# Patient Record
Sex: MALE | Race: WHITE | NOT HISPANIC OR LATINO | Employment: FULL TIME | URBAN - METROPOLITAN AREA
[De-identification: names, ages, dates, MRNs, and addresses within clinical notes are randomized per-mention and may not be internally consistent; named-entity substitution may affect disease eponyms.]

---

## 2017-01-30 ENCOUNTER — PATIENT MESSAGE (OUTPATIENT)
Dept: GASTROENTEROLOGY | Facility: CLINIC | Age: 57
End: 2017-01-30

## 2017-01-30 ENCOUNTER — OFFICE VISIT (OUTPATIENT)
Dept: CARDIOLOGY | Facility: CLINIC | Age: 57
DRG: 305 | End: 2017-01-30
Payer: COMMERCIAL

## 2017-01-30 VITALS
WEIGHT: 209 LBS | DIASTOLIC BLOOD PRESSURE: 98 MMHG | SYSTOLIC BLOOD PRESSURE: 146 MMHG | HEIGHT: 70 IN | HEART RATE: 73 BPM | BODY MASS INDEX: 29.92 KG/M2

## 2017-01-30 DIAGNOSIS — I15.9 SECONDARY HYPERTENSION: Primary | ICD-10-CM

## 2017-01-30 DIAGNOSIS — I10 ESSENTIAL HYPERTENSION: ICD-10-CM

## 2017-01-30 DIAGNOSIS — K21.9 GASTROESOPHAGEAL REFLUX DISEASE, ESOPHAGITIS PRESENCE NOT SPECIFIED: ICD-10-CM

## 2017-01-30 DIAGNOSIS — R30.0 DYSURIA: ICD-10-CM

## 2017-01-30 PROCEDURE — 3078F DIAST BP <80 MM HG: CPT | Mod: S$GLB,,, | Performed by: INTERNAL MEDICINE

## 2017-01-30 PROCEDURE — 3077F SYST BP >= 140 MM HG: CPT | Mod: S$GLB,,, | Performed by: INTERNAL MEDICINE

## 2017-01-30 PROCEDURE — 1159F MED LIST DOCD IN RCRD: CPT | Mod: S$GLB,,, | Performed by: INTERNAL MEDICINE

## 2017-01-30 PROCEDURE — 99999 PR PBB SHADOW E&M-EST. PATIENT-LVL III: CPT | Mod: PBBFAC,,, | Performed by: INTERNAL MEDICINE

## 2017-01-30 PROCEDURE — 99202 OFFICE O/P NEW SF 15 MIN: CPT | Mod: S$GLB,,, | Performed by: INTERNAL MEDICINE

## 2017-01-30 RX ORDER — OMEPRAZOLE 20 MG/1
20 CAPSULE, DELAYED RELEASE ORAL DAILY
Qty: 60 CAPSULE | Refills: 0 | Status: SHIPPED | OUTPATIENT
Start: 2017-01-30 | End: 2022-01-19

## 2017-01-30 RX ORDER — METOPROLOL SUCCINATE 100 MG/1
100 TABLET, EXTENDED RELEASE ORAL NIGHTLY
Qty: 90 TABLET | Refills: 4 | Status: ON HOLD | OUTPATIENT
Start: 2017-01-30 | End: 2017-02-01 | Stop reason: HOSPADM

## 2017-01-30 NOTE — PROGRESS NOTES
"Subjective:   Patient ID:  Steve Carranza is a 56 y.o. male who presents for evaluation and treatment of malaise    HPI   Steve was feeling well until one week ago when he developed the onset of malaise which has persisted.  He also complains of generalized "achy head" which he distinguishes from a headache, full head, and lightheadedness when urinating standing.  He has taken zyrtec with maybe some relief but advil has done nothing.  He was on two meds for HTN (Toprol and Norvasc) until a year ago when Regino Cochran discontinued the beta blocker because he was exercizing like a maniac and lost 25 lbs and his BP was controlled.  He now only exercises twice a week (tennis or bicycle) and has gained weight back.    PMH:    HTN  GERD    Review of Systems   Constitution: Positive for weakness, malaise/fatigue and weight gain. Negative for weight loss.   HENT: Positive for headaches. Negative for congestion, nosebleeds and sore throat.    Eyes: Negative for blurred vision, double vision, pain and visual disturbance.   Cardiovascular: Negative for chest pain, claudication, cyanosis, dyspnea on exertion, irregular heartbeat, leg swelling, near-syncope, orthopnea, palpitations, paroxysmal nocturnal dyspnea and syncope.   Respiratory: Negative for cough, hemoptysis, shortness of breath, snoring, sputum production and wheezing.    Endocrine: Negative for polydipsia and polyuria.   Hematologic/Lymphatic: Negative for bleeding problem. Does not bruise/bleed easily.   Skin: Negative for color change, poor wound healing and rash.   Musculoskeletal: Negative for arthritis, back pain, joint pain, muscle weakness, myalgias and neck pain.   Gastrointestinal: Negative for abdominal pain, anorexia, constipation, diarrhea, heartburn, hematemesis, hematochezia, hemorrhoids, jaundice, melena, nausea and vomiting.   Genitourinary: Negative for flank pain, hematuria, hesitancy, incomplete emptying and nocturia.   Neurological: Positive for " light-headedness. Negative for excessive daytime sleepiness, dizziness, focal weakness, loss of balance, numbness, paresthesias, seizures, sensory change, tremors and vertigo.   Psychiatric/Behavioral: Negative for altered mental status, depression, hallucinations and memory loss. The patient does not have insomnia and is not nervous/anxious.        Objective:   Physical Exam   Constitutional: He is oriented to person, place, and time. He appears well-developed and well-nourished. No distress.   HENT:   Head: Normocephalic and atraumatic.   Eyes: Conjunctivae and EOM are normal. Pupils are equal, round, and reactive to light. No scleral icterus.   Neck: Normal range of motion. Neck supple. No JVD present. No tracheal deviation present. No thyromegaly present.   Cardiovascular: Normal rate, regular rhythm and normal pulses.  PMI is not displaced.  Exam reveals no gallop, no S3, no S4 and no friction rub.    No murmur heard.  Pulmonary/Chest: Effort normal and breath sounds normal. He has no wheezes. He has no rales.   Abdominal: Soft. Bowel sounds are normal. He exhibits no distension and no mass. There is no tenderness.   Musculoskeletal: Normal range of motion. He exhibits no edema or tenderness.   Neurological: He is alert and oriented to person, place, and time.   Skin: Skin is warm and dry. No rash noted. No erythema.   Psychiatric: He has a normal mood and affect. His behavior is normal.     CBC, CHEM 20, and UA all normal.    Assessment:     1. Essential hypertension: Poorly controlled.  Likely the cause of all of his symptoms of malaise, headache, lightheadedness, etc.   2. Gastroesophageal reflux disease, esophagitis presence not specified        Plan:     Restart Toprol 100mg Daily with BID dosing today.  Call me in two days with f/u.  CBC, CHem 20, UA ordered and NORMAL  F/U in one week with BP check.

## 2017-01-30 NOTE — MR AVS SNAPSHOT
[facility] Card  [address]  [facility] [address]  Phone: [phone]                  Steve Carranza   2017 9:00 AM   Office Visit    Description:  Male : 1960   Provider:  Daniel Lozano MD   Department:  [facility]           Diagnoses this Visit        Comments    Essential hypertension         Gastroesophageal reflux disease, esophagitis presence not specified                To Do List           Future Appointments        Provider Department Dept Phone    2017 9:20 AM Daniel Lozano MD [facility] [phone]      Goals (5 Years of Data)     None       These Medications        Disp Refills Start End    omeprazole (PRILOSEC) 20 MG capsule 60 capsule 0 2017     Take 1 capsule (20 mg total) by mouth once daily. As needed - Oral    Pharmacy: [facility] [address] AT [facility] Ph #: [phone]       metoprolol succinate (TOPROL-XL) 100 MG 24 hr tablet 90 tablet 4 2017     Take 1 tablet (100 mg total) by mouth every evening. - Oral    Pharmacy: [facility] [id number] AT [facility] Ph #: [phone]         [facility]     [facility] On Call Nurse Care Line -  Assistance  Registered nurses in the [facility] provide clinical advisement, health education, appointment booking, and other advisory services.  Call for this free service at [phone].             Medications           Message regarding Medications     Verify the changes and/or additions to your medication regime listed below are the same as discussed with your clinician today.  If any of these changes or additions are incorrect, please notify your healthcare provider.        CHANGE how you are taking these medications     Start Taking Instead of    omeprazole (PRILOSEC) 20 MG capsule omeprazole (PRILOSEC) 20 MG  "capsule    Dosage:  Take 1 capsule (20 mg total) by mouth once daily. As needed Dosage:  TAKE ONE CAPSULE BY MOUTH EVERY DAY    Reason for Change:  Reorder     metoprolol succinate (TOPROL-XL) 100 MG 24 hr tablet metoprolol succinate (TOPROL-XL) 100 MG 24 hr tablet    Dosage:  Take 1 tablet (100 mg total) by mouth every evening. Dosage:  Take 100 mg by mouth every evening.    Reason for Change:  Reorder       STOP taking these medications     oxycodone-acetaminophen  mg (PERCOCET)  mg per tablet Take 1 tablet by mouth every 4 (four) hours as needed for Pain. No drinking driving or operating machinery    pantoprazole (PROTONIX) 20 MG tablet Take 20 mg by mouth every evening.    meloxicam (MOBIC) 15 MG tablet TAKE 1 TABLET BY MOUTH EVERY DAY           Verify that the below list of medications is an accurate representation of the medications you are currently taking.  If none reported, the list may be blank. If incorrect, please contact your healthcare provider. Carry this list with you in case of emergency.           Current Medications     amlodipine (NORVASC) 10 MG tablet Take 10 mg by mouth once daily.    aspirin (ECOTRIN) 81 MG EC tablet Take 81 mg by mouth once daily.    omeprazole (PRILOSEC) 20 MG capsule Take 1 capsule (20 mg total) by mouth once daily. As needed    metoprolol succinate (TOPROL-XL) 100 MG 24 hr tablet Take 1 tablet (100 mg total) by mouth every evening.    multivitamin capsule Take 1 capsule by mouth once daily.           Clinical Reference Information           Vital Signs - Last Recorded  Most recent update: 1/30/2017  9:18 AM by Martha Orozco MA    BP Pulse Ht Wt BMI    (!) 146/98 (BP Location: Right arm, Patient Position: Sitting, BP Method: Automatic) 73 5' 9.5" (1.765 m) 94.8 kg (208 lb 15.9 oz) 30.42 kg/m2      Blood Pressure          Most Recent Value    BP  (!)  146/98      Allergies as of 1/30/2017     Penicillins      Immunizations Administered on Date of Encounter - " 1/30/2017     None

## 2017-02-01 ENCOUNTER — HOSPITAL ENCOUNTER (INPATIENT)
Facility: HOSPITAL | Age: 57
LOS: 1 days | Discharge: HOME | DRG: 305 | End: 2017-02-01
Attending: INTERNAL MEDICINE | Admitting: INTERNAL MEDICINE
Payer: COMMERCIAL

## 2017-02-01 ENCOUNTER — TELEPHONE (OUTPATIENT)
Dept: ENDOCRINOLOGY | Facility: CLINIC | Age: 57
End: 2017-02-01

## 2017-02-01 ENCOUNTER — DOCUMENTATION ONLY (OUTPATIENT)
Dept: CARDIOLOGY | Facility: CLINIC | Age: 57
End: 2017-02-01

## 2017-02-01 VITALS
DIASTOLIC BLOOD PRESSURE: 83 MMHG | HEART RATE: 65 BPM | BODY MASS INDEX: 29.35 KG/M2 | TEMPERATURE: 98 F | WEIGHT: 205 LBS | OXYGEN SATURATION: 99 % | HEIGHT: 70 IN | SYSTOLIC BLOOD PRESSURE: 128 MMHG | RESPIRATION RATE: 18 BRPM

## 2017-02-01 DIAGNOSIS — I10 HTN (HYPERTENSION), MALIGNANT: Primary | ICD-10-CM

## 2017-02-01 DIAGNOSIS — I16.0 HYPERTENSIVE URGENCY: Primary | ICD-10-CM

## 2017-02-01 LAB
ALBUMIN SERPL BCP-MCNC: 4.4 G/DL
ALP SERPL-CCNC: 62 U/L
ALT SERPL W/O P-5'-P-CCNC: 30 U/L
ANION GAP SERPL CALC-SCNC: 13 MMOL/L
AST SERPL-CCNC: 46 U/L
BASOPHILS # BLD AUTO: 0.01 K/UL
BASOPHILS NFR BLD: 0.2 %
BILIRUB SERPL-MCNC: 1.1 MG/DL
BUN SERPL-MCNC: 20 MG/DL
CALCIUM SERPL-MCNC: 9.5 MG/DL
CHLORIDE SERPL-SCNC: 102 MMOL/L
CO2 SERPL-SCNC: 21 MMOL/L
CREAT SERPL-MCNC: 1 MG/DL
DIFFERENTIAL METHOD: ABNORMAL
EOSINOPHIL # BLD AUTO: 0.2 K/UL
EOSINOPHIL NFR BLD: 2.3 %
ERYTHROCYTE [DISTWIDTH] IN BLOOD BY AUTOMATED COUNT: 12.6 %
EST. GFR  (AFRICAN AMERICAN): >60 ML/MIN/1.73 M^2
EST. GFR  (NON AFRICAN AMERICAN): >60 ML/MIN/1.73 M^2
GLUCOSE SERPL-MCNC: 131 MG/DL
HCT VFR BLD AUTO: 39.3 %
HGB BLD-MCNC: 13.7 G/DL
LYMPHOCYTES # BLD AUTO: 1.3 K/UL
LYMPHOCYTES NFR BLD: 20.2 %
MCH RBC QN AUTO: 31.6 PG
MCHC RBC AUTO-ENTMCNC: 34.9 %
MCV RBC AUTO: 91 FL
MONOCYTES # BLD AUTO: 0.6 K/UL
MONOCYTES NFR BLD: 9 %
NEUTROPHILS # BLD AUTO: 4.4 K/UL
NEUTROPHILS NFR BLD: 68 %
PLATELET # BLD AUTO: 249 K/UL
PMV BLD AUTO: 10.3 FL
POCT GLUCOSE: 96 MG/DL (ref 70–110)
POTASSIUM SERPL-SCNC: 5 MMOL/L
PROT SERPL-MCNC: 8.1 G/DL
RBC # BLD AUTO: 4.33 M/UL
SODIUM SERPL-SCNC: 136 MMOL/L
T4 FREE SERPL-MCNC: 0.98 NG/DL
TSH SERPL DL<=0.005 MIU/L-ACNC: 3.58 UIU/ML
WBC # BLD AUTO: 6.47 K/UL

## 2017-02-01 PROCEDURE — 84439 ASSAY OF FREE THYROXINE: CPT

## 2017-02-01 PROCEDURE — 36415 COLL VENOUS BLD VENIPUNCTURE: CPT

## 2017-02-01 PROCEDURE — 82962 GLUCOSE BLOOD TEST: CPT

## 2017-02-01 PROCEDURE — 85025 COMPLETE CBC W/AUTO DIFF WBC: CPT

## 2017-02-01 PROCEDURE — 84244 ASSAY OF RENIN: CPT | Mod: 91

## 2017-02-01 PROCEDURE — 80053 COMPREHEN METABOLIC PANEL: CPT

## 2017-02-01 PROCEDURE — 11000001 HC ACUTE MED/SURG PRIVATE ROOM

## 2017-02-01 PROCEDURE — 82088 ASSAY OF ALDOSTERONE: CPT

## 2017-02-01 PROCEDURE — 99222 1ST HOSP IP/OBS MODERATE 55: CPT | Mod: ,,, | Performed by: INTERNAL MEDICINE

## 2017-02-01 PROCEDURE — 84244 ASSAY OF RENIN: CPT

## 2017-02-01 PROCEDURE — 82088 ASSAY OF ALDOSTERONE: CPT | Mod: 91

## 2017-02-01 PROCEDURE — 25000003 PHARM REV CODE 250: Performed by: INTERNAL MEDICINE

## 2017-02-01 PROCEDURE — 84443 ASSAY THYROID STIM HORMONE: CPT

## 2017-02-01 PROCEDURE — 83835 ASSAY OF METANEPHRINES: CPT

## 2017-02-01 RX ORDER — ALPRAZOLAM 0.25 MG/1
0.25 TABLET ORAL 3 TIMES DAILY PRN
Status: DISCONTINUED | OUTPATIENT
Start: 2017-02-01 | End: 2017-02-01 | Stop reason: HOSPADM

## 2017-02-01 RX ORDER — IBUPROFEN 200 MG
16 TABLET ORAL
Status: DISCONTINUED | OUTPATIENT
Start: 2017-02-01 | End: 2017-02-01 | Stop reason: HOSPADM

## 2017-02-01 RX ORDER — IBUPROFEN 200 MG
24 TABLET ORAL
Status: DISCONTINUED | OUTPATIENT
Start: 2017-02-01 | End: 2017-02-01 | Stop reason: HOSPADM

## 2017-02-01 RX ORDER — GLUCAGON 1 MG
1 KIT INJECTION
Status: CANCELLED | OUTPATIENT
Start: 2017-02-01

## 2017-02-01 RX ORDER — METOPROLOL SUCCINATE 100 MG/1
100 TABLET, EXTENDED RELEASE ORAL 2 TIMES DAILY
Status: DISCONTINUED | OUTPATIENT
Start: 2017-02-01 | End: 2017-02-01 | Stop reason: HOSPADM

## 2017-02-01 RX ORDER — IBUPROFEN 200 MG
16 TABLET ORAL
Status: CANCELLED | OUTPATIENT
Start: 2017-02-01

## 2017-02-01 RX ORDER — ALPRAZOLAM 0.25 MG/1
0.25 TABLET ORAL 3 TIMES DAILY
Qty: 30 TABLET | Refills: 0 | Status: SHIPPED | OUTPATIENT
Start: 2017-02-01 | End: 2017-12-05

## 2017-02-01 RX ORDER — METOPROLOL SUCCINATE 100 MG/1
100 TABLET, EXTENDED RELEASE ORAL 2 TIMES DAILY
Qty: 180 TABLET | Refills: 4 | Status: SHIPPED | OUTPATIENT
Start: 2017-02-01 | End: 2018-02-01

## 2017-02-01 RX ORDER — IBUPROFEN 200 MG
24 TABLET ORAL
Status: CANCELLED | OUTPATIENT
Start: 2017-02-01

## 2017-02-01 RX ORDER — GLUCAGON 1 MG
1 KIT INJECTION
Status: DISCONTINUED | OUTPATIENT
Start: 2017-02-01 | End: 2017-02-01 | Stop reason: HOSPADM

## 2017-02-01 RX ADMIN — ALPRAZOLAM 0.25 MG: 0.25 TABLET ORAL at 04:02

## 2017-02-01 RX ADMIN — METOPROLOL SUCCINATE 100 MG: 100 TABLET, FILM COATED, EXTENDED RELEASE ORAL at 01:02

## 2017-02-01 NOTE — PLAN OF CARE
Problem: Patient Care Overview  Goal: Plan of Care Review  Outcome: Ongoing (interventions implemented as appropriate)  Admit assessment completed. IV placed x 1.  Labs sent.  Plan of care initiated. Call light within reach. Bed low and locked.  Pt oriented to room.   Will continue to monitor.

## 2017-02-01 NOTE — IP AVS SNAPSHOT
[facility]  [address]  [facility] [address]  Phone: [phone]           Patient Discharge Instructions     Our goal is to set you up for success. This packet includes information on your condition, medications, and your home care. It will help you to care for yourself so you don't get sicker and need to go back to the hospital.     Please ask your nurse if you have any questions.        There are many details to remember when preparing to leave the hospital. Here is what you will need to do:    1. Take your medicine. If you are prescribed medications, review your Medication List in the following pages. You may have new medications to  at the pharmacy and others that you'll need to stop taking. Review the instructions for how and when to take your medications. Talk with your doctor or nurses if you are unsure of what to do.     2. Go to your follow-up appointments. Specific follow-up information is listed in the following pages. Your may be contacted by a transition nurse or clinical provider about future appointments. Be sure we have all of the phone numbers to reach you, if needed. Please contact your provider's office if you are unable to make an appointment.     3. Watch for warning signs. Your doctor or nurse will give you detailed warning signs to watch for and when to call for assistance. These instructions may also include educational information about your condition. If you experience any of warning signs to your health, call your doctor.               [facility]  Unless otherwise directed by your provider, please contact [facility], our nurse care line that is available for 24/7 assistance.     [phone] (toll-free)    Registered nurses in the [facility] provide clinical advisement, health education, appointment booking, and other advisory services.                    ** Verify the list of medication(s) below is accurate and up  to date. Carry this with you in case of emergency. If your medications have changed, please notify your healthcare provider.             Medication List      START taking these medications        Additional Info                      alprazolam 0.25 MG tablet   Commonly known as:  XANAX   Quantity:  30 tablet   Refills:  0   Dose:  0.25 mg    Last time this was given:  0.25 mg on 2/1/2017  4:08 PM   Instructions:  Take 1 tablet (0.25 mg total) by mouth 3 (three) times daily.     Begin Date    AM    Noon    PM    Bedtime         CHANGE how you take these medications        Additional Info                      metoprolol succinate 100 MG 24 hr tablet   Commonly known as:  TOPROL-XL   Quantity:  180 tablet   Refills:  4   Dose:  100 mg   What changed:  when to take this    Last time this was given:  100 mg on 2/1/2017  1:48 PM   Instructions:  Take 1 tablet (100 mg total) by mouth 2 (two) times daily.     Begin Date    AM    Noon    PM    Bedtime         CONTINUE taking these medications        Additional Info                      amlodipine 10 MG tablet   Commonly known as:  NORVASC   Refills:  0   Dose:  10 mg    Instructions:  Take 10 mg by mouth once daily.     Begin Date    AM    Noon    PM    Bedtime       aspirin 81 MG EC tablet   Commonly known as:  ECOTRIN   Refills:  0   Dose:  81 mg    Instructions:  Take 81 mg by mouth once daily.     Begin Date    AM    Noon    PM    Bedtime       multivitamin capsule   Refills:  0   Dose:  1 capsule    Instructions:  Take 1 capsule by mouth once daily.     Begin Date    AM    Noon    PM    Bedtime       omeprazole 20 MG capsule   Commonly known as:  PRILOSEC   Quantity:  60 capsule   Refills:  0   Dose:  20 mg    Instructions:  Take 1 capsule (20 mg total) by mouth once daily. As needed     Begin Date    AM    Noon    PM    Bedtime            Where to Get Your Medications      These medications were sent to [facility] [id number] [facility] [address] [place] [facility]  [address] [facility] [address]     Phone:  [phone]     metoprolol succinate 100 MG 24 hr tablet         You can get these medications from any pharmacy     Bring a paper prescription for each of these medications     alprazolam 0.25 MG tablet                  Please bring to all follow up appointments:    1. A copy of your discharge instructions.  2. All medicines you are currently taking in their original bottles.  3. Identification and insurance card.    Please arrive 15 minutes ahead of scheduled appointment time.    Please call 24 hours in advance if you must reschedule your appointment and/or time.        Your Scheduled Appointments     Feb 03, 2017 12:40 PM CST   Ct Abd Pel W Contrast with [facility] CT6 MOBILE LIMIT 450 LBS   [facility]    [address] [facility] [address] [phone]            Feb 06, 2017  9:20 AM CST   Established Patient Visit with Daniel Lozano MD   [facility] Card ([facility] )    [address]  [place] [address] [phone]                Discharge Instructions     Future Orders    Activity as tolerated     Call MD for:  difficulty breathing or increased cough     Call MD for:  increased confusion or weakness     Call MD for:  persistent dizziness, light-headedness, or visual disturbances     Call MD for:  persistent nausea and vomiting or diarrhea     Call MD for:  redness, tenderness, or signs of infection (pain, swelling, redness, odor or green/yellow discharge around incision site)     Call MD for:  severe persistent headache     Call MD for:  severe uncontrolled pain     Call MD for:  temperature >100.4     Call MD for:  worsening rash     Diet general     Questions:    Total calories:      Fat restriction, if any:      Protein restriction, if any:      Na restriction, if any:      Fluid restriction:      Additional restrictions:  Cardiac (Low  "Na/Chol)    Lifting restrictions     Scheduling Instructions:    No lifting more than 5 lbs for 5 days        Primary Diagnosis     Your primary diagnosis was:  Severe Uncontrolled High Blood Pressure      Admission Information     Date & Time Provider Department CSN    2/1/2017 11:49 AM Daniel Lozano MD [facility] [id number]      Care Providers     Provider Role Specialty Primary office phone    Daniel Lozano MD Attending Provider Cardiology [phone]    Selvin Kaufman II, MD Consulting Physician  Endocrinology [phone]    Jan Han MD Consulting Physician  Endocrinology [phone]    Ita Duke MD Consulting Physician  Endocrinology [phone]    Carline Angulo MD (Inactive) Consulting Physician  Endocrinology [phone]    Karin Blood MD (Inactive) Consulting Physician  Endocrinology [phone]    Ketty Lechuga MD Consulting Physician  Endocrinology [phone]      Your Vitals Were     BP Pulse Temp Resp Height Weight    137/84 (BP Location: Right arm, Patient Position: Lying, BP Method: Automatic) 65 98.1 °F (36.7 °C) (Oral) 18 5' 9.5" (1.765 m) 93 kg (205 lb)    SpO2 BMI             99% 29.84 kg/m2         Recent Lab Values        5/15/2015                           7:59 AM           A1C 4.0 (L)                       Pending Labs     Order Current Status    Aldosterone In process    Aldosterone/Renin Activity Ratio In process    Metanephrines, Plasma Free In process    Renin In process    Urinalysis In process      Allergies as of 2/1/2017        Reactions    Penicillins       Advance Directives     An advance directive is a document which, in the event you are no longer able to make decisions for yourself, tells your healthcare team what kind of treatment you do or do not want to receive, or who you would like to make those decisions for you.  If you do not currently have an advance directive, [facility] encourages you to create one.  " For more information call:  [phone]), [phone] ([phone]),  or log on to [email or web address].        Language Assistance Services     ATTENTION: Language assistance services are available, free of charge. Please call [phone].      ATENCIÓN: Si habla español, tiene a toney disposición servicios gratuitos de asistencia lingüística. Llame al [phone].     [facility] Ý: N?u b?n nói Ti?ng Vi?t, có các d?ch v? h? tr? ngôn ng? mi?n phí dành cho b?n. G?i s? [phone].         [facility] complies with applicable Federal civil rights laws and does not discriminate on the basis of race, color, national origin, age, disability, or sex.

## 2017-02-01 NOTE — PROGRESS NOTES
Subjective:   Patient ID:  Steve Carranza is a 56 y.o. male who presents for evaluation and treatment of malignant hypertension.    HPI: Steve came to see me Monday with malignant HTN.  I added Toprol XL BID for one day to his Norvasc 10mg with immediate relief of his symptoms.  This morning while at a meeting here, he developed recurrent symptoms of malaise, lightheadedness, and came to the clinic where his BP was 200/100 and his EKG was normal.  Within 30 minutes, his BP was 155/85 and he was feeling better.  No treatment was given.     Review of Systems   Constitution: Negative for weakness, malaise/fatigue, weight gain and weight loss.   HENT: Negative for congestion, headaches, nosebleeds and sore throat.    Eyes: Negative for blurred vision, double vision, pain and visual disturbance.   Cardiovascular: Negative for chest pain, claudication, cyanosis, dyspnea on exertion, irregular heartbeat, leg swelling, near-syncope, orthopnea, palpitations, paroxysmal nocturnal dyspnea and syncope.   Respiratory: Negative for cough, hemoptysis, shortness of breath, snoring, sputum production and wheezing.    Endocrine: Negative for polydipsia and polyuria.   Hematologic/Lymphatic: Negative for bleeding problem. Does not bruise/bleed easily.   Skin: Negative for color change, poor wound healing and rash.   Musculoskeletal: Negative for arthritis, back pain, joint pain, muscle weakness, myalgias and neck pain.   Gastrointestinal: Negative for abdominal pain, anorexia, constipation, diarrhea, heartburn, hematemesis, hematochezia, hemorrhoids, jaundice, melena, nausea and vomiting.   Genitourinary: Negative for flank pain, hematuria, hesitancy, incomplete emptying, menorrhagia and nocturia.   Neurological: Negative for excessive daytime sleepiness, dizziness, focal weakness, light-headedness, loss of balance, numbness, paresthesias, seizures, sensory change, tremors and vertigo.   Psychiatric/Behavioral: Negative for altered  mental status, depression, hallucinations and memory loss. The patient does not have insomnia and is not nervous/anxious.        Objective:   Physical Exam   Constitutional: He is oriented to person, place, and time. He appears well-developed and well-nourished. He appears distressed.   HENT:   Head: Normocephalic and atraumatic.   Eyes: Conjunctivae and EOM are normal. Pupils are equal, round, and reactive to light. No scleral icterus.   Neck: Normal range of motion. Neck supple. No JVD present. No tracheal deviation present. No thyromegaly present.   Cardiovascular: Normal rate, regular rhythm and normal pulses.  PMI is not displaced.  Exam reveals no gallop, no S3, no S4 and no friction rub.    No murmur heard.  Pulmonary/Chest: Effort normal and breath sounds normal. He has no wheezes. He has no rales.   Abdominal: Soft. Bowel sounds are normal. He exhibits no distension and no mass. There is no tenderness.   Musculoskeletal: Normal range of motion. He exhibits no edema or tenderness.   Neurological: He is alert and oriented to person, place, and time.   Skin: Skin is warm and dry. No rash noted. No erythema.   Psychiatric: He has a normal mood and affect. His behavior is normal.     CMP and CBC Monday normal.  EKG today normal.    Assessment:     Hypertensive Urgency     Plan:     Admit for observation and acute treatment of HTN.  Evaluate for secondary causes of malignant HTN.  Consult endocrinology.

## 2017-02-01 NOTE — ASSESSMENT & PLAN NOTE
Endocrine consulted for possible secondary causes.   Will check renin, mishel   Can consider serum metanephrine's, noting that they can be high in hospital setting.   Can consider 1mg dex suppression as outpatient   Please note these test are best done in outpatient setting.  D/w Dr Lozano

## 2017-02-01 NOTE — NURSING
Pt verbalized an understanding of discharge instructions including diet, s/s to notify md, post procedure care, and follow up. Prescriptions given.  Pt refused wheel chair and ambulated off unit.

## 2017-02-01 NOTE — DISCHARGE SUMMARY
[facility]  Discharge Summary      Admit Date: 2/1/2017    Discharge Date and Time: 2/1/2017 2:54 PM    Attending Physician: Daniel Lozano MD     Reason for Admission: Steve came to see me Monday with malignant HTN. I added Toprol XL BID for one day to his Norvasc 10mg with immediate relief of his symptoms. This morning while at a meeting here, he developed recurrent symptoms of malaise, lightheadedness, and came to the clinic where his BP was 200/100 and his EKG was normal. Within 30 minutes, his BP was 155/85 and he was feeling better. No treatment was given.     Procedures Performed: * No surgery found *    Hospital Course (synopsis of major diagnoses, care, treatment, and services provided during the course of the hospital stay): Mr. Carranza was admitted and seen by Endocrinology. Appropriate labs were ordered and it was advised by Endo that further testing be done as an outpatient. His BP was stable and he was without complaints. He was eager to go home.  It was felt he was stable for discharge.     Consults: Endocrinology    Significant Diagnostic Studies: Labs: pending    Final Diagnoses:    Principal Problem: Hypertensive urgency   Secondary Diagnoses:   Active Hospital Problems    Diagnosis  POA    *Hypertensive urgency [I16.0]  Yes      Resolved Hospital Problems    Diagnosis Date Resolved POA   No resolved problems to display.       Discharged Condition: stable    Disposition: Home or Self Care    Follow Up/Patient Instructions: Follow up with Endocrinology    Medications:  Reconciled Home Medications:   Current Discharge Medication List      CONTINUE these medications which have CHANGED    Details   metoprolol succinate (TOPROL-XL) 100 MG 24 hr tablet Take 1 tablet (100 mg total) by mouth 2 (two) times daily.  Qty: 180 tablet, Refills: 4         CONTINUE these medications which have NOT CHANGED    Details   amlodipine (NORVASC) 10 MG tablet Take 10 mg by mouth once daily.       aspirin (ECOTRIN) 81 MG EC tablet Take 81 mg by mouth once daily.      omeprazole (PRILOSEC) 20 MG capsule Take 1 capsule (20 mg total) by mouth once daily. As needed  Qty: 60 capsule, Refills: 0      alprazolam (XANAX) 0.25 MG tablet Take 1 tablet (0.25 mg total) by mouth 3 (three) times daily.  Qty: 30 tablet, Refills: 0      multivitamin capsule Take 1 capsule by mouth once daily.             Discharge Procedure Orders  Diet general   Order Specific Question Answer Comments   Additional restrictions: Cardiac (Low Na/Chol)      Activity as tolerated     Lifting restrictions   Scheduling Instructions: No lifting more than 5 lbs for 5 days     Call MD for:  temperature >100.4     Call MD for:  persistent nausea and vomiting or diarrhea     Call MD for:  severe uncontrolled pain     Call MD for:  redness, tenderness, or signs of infection (pain, swelling, redness, odor or green/yellow discharge around incision site)     Call MD for:  difficulty breathing or increased cough     Call MD for:  severe persistent headache     Call MD for:  worsening rash     Call MD for:  persistent dizziness, light-headedness, or visual disturbances     Call MD for:  increased confusion or weakness

## 2017-02-01 NOTE — PROGRESS NOTES
Staff:    Mr. Carranza shared with me his anxiety over child rearing with two young children.  This seems to have been the trigger psychologically for his anxiety and possibly his hypertensive emergency.  He responded nicely to xanax 0.25mg orally.      IMP:      Hypertensive urgency  Anxiety/panic disorder    REC:    Endocrine to evaluate for hormonal abnormalities/pheo, etc.  Henri Queen to evaluate for panic disorder/anxiety  I will increase beta blocker to 100mg BID and start Xanax 0.25 TID until Henri can see him and help us.

## 2017-02-01 NOTE — SUBJECTIVE & OBJECTIVE
PMH, PSH, FH, SH updated and reviewed       REVIEW OF SYSTEMS:  Constitutional: + weight changes.  Eyes: Negative for visual disturbance.  Respiratory: Negative for cough.   Cardiovascular: Negative for chest pain.  Gastrointestinal: Negative for nausea  Endocrine: denies polyuria, polydipsia, nocturia.  Musculoskeletal: Negative for back pain.  Skin: Negative for rash.  Neurological: Negative for syncope.  Psychiatric/Behavioral: Negative for depression, anxiety.   All other systems reviewed and are negative.    Labs Reviewed and Include:  BASELINE Creatinine:   [unfilled]  [unfilled]  [unfilled]  No results for input(s): GLU, CALCIUM, ALBUMIN, PROT, NA, K, CO2, CL, BUN, CREATININE, ALKPHOS, ALT, AST, BILITOT in the last 24 hours.  Lab Results   Component Value Date    HGBA1C 4.0 (L) 05/15/2015       Nutritional status:   There is no height or weight on file to calculate BMI.  Lab Results   Component Value Date    ALBUMIN 4.6 01/30/2017    ALBUMIN 4.7 08/31/2015    ALBUMIN 4.5 05/15/2015     No results found for: PREALBUMIN    CrCl cannot be calculated (Unknown ideal weight.).    POCT Glucose results:    Current Insulin Regimen:    PHYSICAL EXAMINATION:  There were no vitals filed for this visit.  There is no height or weight on file to calculate BMI.    Constitutional:   ENMT: External ears, nose with no mass or significant findings, Hearing intact  Neck:  No tracheal deviation present, No neck masses noted.  No thyromegaly or thyroid tenderness.  Cardiovascular:  No murmurs or abnormal sounds, no lower extremity edema bilaterally  Respiratory:  Normal effort, no use of accessory muscles, Normal breath sounds without adventitious sounds  Abdomen:  Soft, no masses, no tenderness, Bowel sounds are normal. No hernia noted  Skin:  No rashes, lesions or ulcers, no induration or nodules  Psychiatric:  Judgement and insight good with normal mood and affect  Musculoskeletal: Unable to assess gait, digits and nails with  no clubbing, cyanosis or petechiae.  Neurological: Cranial nerves are grossly intact.

## 2017-02-01 NOTE — CONSULTS
"[facility]  Endocrinology  Consult Note    Consult Requested by: Daniel Lozano MD   Reason for admit: <principal problem not specified>    HISTORY OF PRESENT ILLNESS:  Reason for Consult: Management of malignant HTN    HPI:   Patient is a 56 y.o. male with a diagnosis of GERD, HTN admitted by cardiology for possible malignant hypertension.     He was seen by cardiology service on  1/30/2017 with elevated  HTN,  Toprol  mg QD was added to  his Norvasc 10mg with immediate relief of his symptoms.   He was reported to be at a meeting this morning where he developed recurrent symptoms of malaise, lightheadedness, and came to the cardiology clinic where his BP was reported to be 200/100 with a normal EKG. BP improved to155/85 within 30 minutes without any treatment.     Endocrinology consulted for possible evaluation of secondary HTN.   HTN was diagnosed with HTN 10 yr ago ( age 46)   Was initially on amlodipine and Toporol, later he lost 25 pounds and was decreased back to amlodipine only. Over the last 2 years he has gained weight again. He believes this is related to change in his diet and activity    Na 140, K 4.0 ( normal range). Never had issues with hypokalemia   Renal function stable   No hx of DM A1c 4.0  No previous work up for secondary HTN   Denies liquorish use or drug use ( cocaine etc)   When he was loosing weight was on "adva care"     No FH of early death from CVA or malignant htn    Reports the symptome have have been there for a week.   He describes episodes of weakness, "fog" +/- dizzy + HA   States he feel nauseous without developing emesis. States he feels like eating something would make it better.     At the time of interview- reported similar sx   No diaphoresis noted   ? Pale   BG was  96  BP was 158/89  He took his BP medication this morning     Reports his kids have been sick URI for the past one week   Also reports recent job stress.            Medications and/or " Treatments Impacting Glycemic Control:  Immunotherapy:    Immunosuppressants     None        Steroids:   Hormones     None        Pressors:    Autonomic Drugs     None          Prescriptions Prior to Admission   Medication Sig Dispense Refill Last Dose    amlodipine (NORVASC) 10 MG tablet Take 10 mg by mouth once daily.   2/1/2017 at 0800    aspirin (ECOTRIN) 81 MG EC tablet Take 81 mg by mouth once daily.   2/1/2017 at 0800    metoprolol succinate (TOPROL-XL) 100 MG 24 hr tablet Take 1 tablet (100 mg total) by mouth every evening. 90 tablet 4 1/31/2017 at 2200    omeprazole (PRILOSEC) 20 MG capsule Take 1 capsule (20 mg total) by mouth once daily. As needed 60 capsule 0 1/31/2017 at 1200    multivitamin capsule Take 1 capsule by mouth once daily.   Not Taking       Current Facility-Administered Medications   Medication Dose Route Frequency Provider Last Rate Last Dose    dextrose 50% injection 12.5 g  12.5 g Intravenous PRN Ace Guerra MD        dextrose 50% injection 25 g  25 g Intravenous PRN Ace Guerra MD        glucagon (human recombinant) injection 1 mg  1 mg Intramuscular PRN Ace Guerra MD        glucose chewable tablet 16 g  16 g Oral PRN Ace Guerra MD        glucose chewable tablet 24 g  24 g Oral PRN Ace Guerra MD        metoprolol succinate (TOPROL-XL) 24 hr tablet 100 mg  100 mg Oral BID Ace Guerra MD               PMH, PSH, FH, SH updated and reviewed       REVIEW OF SYSTEMS:  Constitutional: + weight changes.  Eyes: Negative for visual disturbance.  Respiratory: Negative for cough.   Cardiovascular: Negative for chest pain.  Gastrointestinal: Negative for nausea  Endocrine: denies polyuria, polydipsia, nocturia.  Musculoskeletal: Negative for back pain.  Skin: Negative for rash.  Neurological: Negative for syncope.  Psychiatric/Behavioral: Negative for depression, anxiety.   All other systems reviewed and are negative.    Labs Reviewed and  Include:  BASELINE Creatinine:   [unfilled]  [unfilled]  [unfilled]  No results for input(s): GLU, CALCIUM, ALBUMIN, PROT, NA, K, CO2, CL, BUN, CREATININE, ALKPHOS, ALT, AST, BILITOT in the last 24 hours.  Lab Results   Component Value Date    HGBA1C 4.0 (L) 05/15/2015       Nutritional status:   There is no height or weight on file to calculate BMI.  Lab Results   Component Value Date    ALBUMIN 4.6 01/30/2017    ALBUMIN 4.7 08/31/2015    ALBUMIN 4.5 05/15/2015     No results found for: PREALBUMIN    CrCl cannot be calculated (Unknown ideal weight.).    POCT Glucose results:    Current Insulin Regimen:    PHYSICAL EXAMINATION:  There were no vitals filed for this visit.  There is no height or weight on file to calculate BMI.    Constitutional:   ENMT: External ears, nose with no mass or significant findings, Hearing intact  Neck:  No tracheal deviation present, No neck masses noted.  No thyromegaly or thyroid tenderness.  Cardiovascular:  No murmurs or abnormal sounds, no lower extremity edema bilaterally  Respiratory:  Normal effort, no use of accessory muscles, Normal breath sounds without adventitious sounds  Abdomen:  Soft, no masses, no tenderness, Bowel sounds are normal. No hernia noted  Skin:  No rashes, lesions or ulcers, no induration or nodules  Psychiatric:  Judgement and insight good with normal mood and affect  Musculoskeletal: Unable to assess gait, digits and nails with no clubbing, cyanosis or petechiae.  Neurological: Cranial nerves are grossly intact.     .     ASSESSMENT and PLAN:    Hypertensive urgency  Endocrine consulted for possible secondary causes.   Will check renin, mishel   Can consider serum metanephrine's, noting that they can be high in hospital setting.   Can consider 1mg dex suppression as outpatient   Please note these test are best done in outpatient setting.  D/w Dr Lozano           Plan discussed with patient and family at bedside.     DISCHARGE NEEDS: will assess daily    Yvonne  DO Juan M  Endocrinology  [facility]

## 2017-02-03 ENCOUNTER — OFFICE VISIT (OUTPATIENT)
Dept: PSYCHIATRY | Facility: CLINIC | Age: 57
End: 2017-02-03
Payer: COMMERCIAL

## 2017-02-03 ENCOUNTER — TELEPHONE (OUTPATIENT)
Dept: ENDOCRINOLOGY | Facility: CLINIC | Age: 57
End: 2017-02-03

## 2017-02-03 DIAGNOSIS — F41.1 GAD (GENERALIZED ANXIETY DISORDER): ICD-10-CM

## 2017-02-03 DIAGNOSIS — F41.0 PANIC DISORDER (EPISODIC PAROXYSMAL ANXIETY) WITHOUT AGORAPHOBIA: Primary | ICD-10-CM

## 2017-02-03 PROCEDURE — 99999 PR PBB SHADOW E&M-EST. PATIENT-LVL II: CPT | Mod: PBBFAC,,, | Performed by: PSYCHIATRY & NEUROLOGY

## 2017-02-03 PROCEDURE — 90792 PSYCH DIAG EVAL W/MED SRVCS: CPT | Mod: S$GLB,,, | Performed by: PSYCHIATRY & NEUROLOGY

## 2017-02-03 RX ORDER — SERTRALINE HYDROCHLORIDE 50 MG/1
50 TABLET, FILM COATED ORAL DAILY
Qty: 30 TABLET | Refills: 1 | Status: SHIPPED | OUTPATIENT
Start: 2017-02-03 | End: 2017-12-05

## 2017-02-03 RX ORDER — CLONAZEPAM 0.5 MG/1
0.5 TABLET ORAL 3 TIMES DAILY
Qty: 90 TABLET | Refills: 1 | Status: SHIPPED | OUTPATIENT
Start: 2017-02-03 | End: 2017-03-15 | Stop reason: SDUPTHER

## 2017-02-03 NOTE — INTERVAL H&P NOTE
The patient has been examined and the H&P has been reviewed:            Active Hospital Problems    Diagnosis  POA    *Hypertensive urgency [I16.0]  Yes      Resolved Hospital Problems    Diagnosis Date Resolved POA   No resolved problems to display.

## 2017-02-03 NOTE — TELEPHONE ENCOUNTER
----- Message from Yvonne Mcgowan, DO sent at 2/3/2017  9:28 AM CST -----      Please set up for follow up after urine studies are obtained.   Ok to set up with myself or Dr. Elizabeth       Thank you  Yvonne Mcgowan

## 2017-02-03 NOTE — PROGRESS NOTES
Outpatient Psychiatry Initial Visit (MD/NP)    2/3/2017    Steve Carranza, a 56 y.o. male, presenting for initial evaluation visit. Met with patient.    Reason for Encounter: Consult from Dr Nathanael Lozano. Patient complains of No chief complaint on file.  .    History of Present Illness: Anxiety  Patient is here for evaluation of anxiety.  He has the following anxiety symptoms: chest pain, difficulty concentrating, dizziness, fatigue, insomnia, irritable, palpitations, paresthesias, psychomotor agitation, racing thoughts, sweating. Onset of symptoms was approximately 11 years ago.  Symptoms have been rapidly worsening since that time. He denies current suicidal and homicidal ideation. Family history significant for no psychiatric illness.Possible organic causes contributing are: none. Risk factors: none Previous treatment includes medication Xanax.   He complains of the following medication side effects: none.    GEO ; does worry excessively about his kids ; health and mortality muscle tension , irrit; fatugue     Dad  of alc liver dz at 50; mom at 82     2 days ago had am mtg here then [facility] with nausea and tension ;  / 100 ; responded to 0.25 mg xanax then again yesterday and did well.     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 [employer]  Sees Dr Rodríguez  - mild ADD dx ;       15 yrs to 6yr younger wife   Review Of Systems:     GENERAL:  No weight gain or loss  SKIN:  No rashes or lacerations  HEAD:  No headaches  EYES:  No exophthalmos, jaundice or blindness  EARS:  No dizziness, tinnitus or hearing loss  NOSE:  No changes in smell  MOUTH & THROAT:  No dyskinetic movements or obvious goiter  CHEST:  No shortness of breath, hyperventilation or cough  CARDIOVASCULAR:  No tachycardia or chest pain  ABDOMEN:  No nausea, vomiting, pain,  constipation or diarrhea  URINARY:  No frequency, dysuria or sexual dysfunction  ENDOCRINE:  No polydipsia, polyuria  MUSCULOSKELETAL:  No pain or stiffness of the joints  NEUROLOGIC:  No weakness, sensory changes, seizures, confusion, memory loss, tremor or other abnormal movements    Current Evaluation:     Nutritional Screening: Considering the patient's height and weight, medications, medical history and preferences, should a referral be made to the dietitian? no    Constitutional  Vitals:  Most recent vital signs, dated less than 90 days prior to this appointment, were reviewed.    There were no vitals filed for this visit.     General:  unremarkable, age appropriate, casually dressed, neatly groomed     Musculoskeletal  Muscle Strength/Tone:  no spasicity, no rigidity, no flaccidity   Gait & Station:  non-ataxic     Psychiatric  Speech:  no latency; no press   Mood & Affect:  anxious  congruent and appropriate   Thought Process:  normal and logical   Associations:  intact   Thought Content:  normal, no suicidality, no homicidality, delusions, or paranoia   Insight:  has awareness of illness   Judgement: behavior is adequate to circumstances   Orientation:  grossly intact   Memory: intact for content of interview   Language: grossly intact   Attention Span & Concentration:  able to focus   Fund of Knowledge:  intact and appropriate to age and level of education       Relevant Elements of Neurological Exam: normal gait    Functioning in Relationships:  Spouse/partner:  ; fa of   Peers: has support   Employers: [employer]     Laboratory Data  Admission on 02/01/2017, Discharged on 02/01/2017   Component Date Value Ref Range Status    WBC 02/01/2017 6.47  3.90 - 12.70 K/uL Final    RBC 02/01/2017 4.33* 4.60 - 6.20 M/uL Final    Hemoglobin 02/01/2017 13.7* 14.0 - 18.0 g/dL Final    Hematocrit 02/01/2017 39.3* 40.0 - 54.0 % Final    MCV 02/01/2017 91  82 - 98 fL Final    MCH 02/01/2017  31.6* 27.0 - 31.0 pg Final    MCHC 02/01/2017 34.9  32.0 - 36.0 % Final    RDW 02/01/2017 12.6  11.5 - 14.5 % Final    Platelets 02/01/2017 249  150 - 350 K/uL Final    MPV 02/01/2017 10.3  9.2 - 12.9 fL Final    Gran # 02/01/2017 4.4  1.8 - 7.7 K/uL Final    Lymph # 02/01/2017 1.3  1.0 - 4.8 K/uL Final    Mono # 02/01/2017 0.6  0.3 - 1.0 K/uL Final    Eos # 02/01/2017 0.2  0.0 - 0.5 K/uL Final    Baso # 02/01/2017 0.01  0.00 - 0.20 K/uL Final    Gran% 02/01/2017 68.0  38.0 - 73.0 % Final    Lymph% 02/01/2017 20.2  18.0 - 48.0 % Final    Mono% 02/01/2017 9.0  4.0 - 15.0 % Final    Eosinophil% 02/01/2017 2.3  0.0 - 8.0 % Final    Basophil% 02/01/2017 0.2  0.0 - 1.9 % Final    Differential Method 02/01/2017 Automated   Final    TSH 02/01/2017 3.584  0.400 - 4.000 uIU/mL Final    POCT Glucose 02/01/2017 96  70 - 110 mg/dL Final    Free T4 02/01/2017 0.98  0.71 - 1.51 ng/dL Final    Sodium 02/01/2017 136  136 - 145 mmol/L Final    Potassium 02/01/2017 5.0  3.5 - 5.1 mmol/L Final    Chloride 02/01/2017 102  95 - 110 mmol/L Final    CO2 02/01/2017 21* 23 - 29 mmol/L Final    Glucose 02/01/2017 131* 70 - 110 mg/dL Final    BUN, Bld 02/01/2017 20  6 - 20 mg/dL Final    Creatinine 02/01/2017 1.0  0.5 - 1.4 mg/dL Final    Calcium 02/01/2017 9.5  8.7 - 10.5 mg/dL Final    Total Protein 02/01/2017 8.1  6.0 - 8.4 g/dL Final    Albumin 02/01/2017 4.4  3.5 - 5.2 g/dL Final    Total Bilirubin 02/01/2017 1.1* 0.1 - 1.0 mg/dL Final    Alkaline Phosphatase 02/01/2017 62  55 - 135 U/L Final    AST 02/01/2017 46* 10 - 40 U/L Final    ALT 02/01/2017 30  10 - 44 U/L Final    Anion Gap 02/01/2017 13  8 - 16 mmol/L Final    eGFR if African American 02/01/2017 >60.0  >60 mL/min/1.73 m^2 Final    eGFR if non African American 02/01/2017 >60.0  >60 mL/min/1.73 m^2 Final   Lab Visit on 01/30/2017   Component Date Value Ref Range Status    Specimen UA 01/30/2017 Urine, Unspecified   Final    Color, UA  01/30/2017 Straw  Yellow, Straw, Christine Final    Appearance, UA 01/30/2017 Clear  Clear Final    pH, UA 01/30/2017 6.0  5.0 - 8.0 Final    Specific Gravity, UA 01/30/2017 1.005  1.005 - 1.030 Final    Protein, UA 01/30/2017 Negative  Negative Final    Glucose, UA 01/30/2017 Negative  Negative Final    Ketones, UA 01/30/2017 Negative  Negative Final    Bilirubin (UA) 01/30/2017 Negative  Negative Final    Occult Blood UA 01/30/2017 Negative  Negative Final    Nitrite, UA 01/30/2017 Negative  Negative Final    Urobilinogen, UA 01/30/2017 Negative  <2.0 EU/dL Final    Leukocytes, UA 01/30/2017 Negative  Negative Final   Lab Visit on 01/30/2017   Component Date Value Ref Range Status    WBC 01/30/2017 5.65  3.90 - 12.70 K/uL Final    RBC 01/30/2017 4.61  4.60 - 6.20 M/uL Final    Hemoglobin 01/30/2017 14.2  14.0 - 18.0 g/dL Final    Hematocrit 01/30/2017 41.4  40.0 - 54.0 % Final    MCV 01/30/2017 90  82 - 98 fL Final    MCH 01/30/2017 30.8  27.0 - 31.0 pg Final    MCHC 01/30/2017 34.3  32.0 - 36.0 % Final    RDW 01/30/2017 12.5  11.5 - 14.5 % Final    Platelets 01/30/2017 277  150 - 350 K/uL Final    MPV 01/30/2017 10.1  9.2 - 12.9 fL Final    Gran # 01/30/2017 3.5  1.8 - 7.7 K/uL Final    Lymph # 01/30/2017 1.4  1.0 - 4.8 K/uL Final    Mono # 01/30/2017 0.5  0.3 - 1.0 K/uL Final    Eos # 01/30/2017 0.1  0.0 - 0.5 K/uL Final    Baso # 01/30/2017 0.01  0.00 - 0.20 K/uL Final    Gran% 01/30/2017 61.9  38.0 - 73.0 % Final    Lymph% 01/30/2017 25.3  18.0 - 48.0 % Final    Mono% 01/30/2017 9.6  4.0 - 15.0 % Final    Eosinophil% 01/30/2017 2.5  0.0 - 8.0 % Final    Basophil% 01/30/2017 0.2  0.0 - 1.9 % Final    Differential Method 01/30/2017 Automated   Final    Sodium 01/30/2017 140  136 - 145 mmol/L Final    Potassium 01/30/2017 4.0  3.5 - 5.1 mmol/L Final    Chloride 01/30/2017 102  95 - 110 mmol/L Final    CO2 01/30/2017 29  23 - 29 mmol/L Final    Glucose 01/30/2017 90  70 - 110 mg/dL  Final    BUN, Bld 01/30/2017 14  6 - 20 mg/dL Final    Creatinine 01/30/2017 0.9  0.5 - 1.4 mg/dL Final    Calcium 01/30/2017 9.8  8.7 - 10.5 mg/dL Final    Total Protein 01/30/2017 7.8  6.0 - 8.4 g/dL Final    Albumin 01/30/2017 4.6  3.5 - 5.2 g/dL Final    Total Bilirubin 01/30/2017 1.0  0.1 - 1.0 mg/dL Final    Alkaline Phosphatase 01/30/2017 63  55 - 135 U/L Final    AST 01/30/2017 23  10 - 40 U/L Final    ALT 01/30/2017 26  10 - 44 U/L Final    Anion Gap 01/30/2017 9  8 - 16 mmol/L Final    eGFR if African American 01/30/2017 >60.0  >60 mL/min/1.73 m^2 Final    eGFR if non African American 01/30/2017 >60.0  >60 mL/min/1.73 m^2 Final    CRP, High Sensitivity 01/30/2017 1.62  0.00 - 3.19 mg/L Final         Medications  Outpatient Encounter Prescriptions as of 2/3/2017   Medication Sig Dispense Refill    alprazolam (XANAX) 0.25 MG tablet Take 1 tablet (0.25 mg total) by mouth 3 (three) times daily. 30 tablet 0    amlodipine (NORVASC) 10 MG tablet Take 10 mg by mouth once daily.      aspirin (ECOTRIN) 81 MG EC tablet Take 81 mg by mouth once daily.      metoprolol succinate (TOPROL-XL) 100 MG 24 hr tablet Take 1 tablet (100 mg total) by mouth 2 (two) times daily. 180 tablet 4    multivitamin capsule Take 1 capsule by mouth once daily.      omeprazole (PRILOSEC) 20 MG capsule Take 1 capsule (20 mg total) by mouth once daily. As needed 60 capsule 0     No facility-administered encounter medications on file as of 2/3/2017.            Assessment - Diagnosis - Goals:     Impression: anxiety      ICD-10-CM ICD-9-CM   1. Panic disorder (episodic paroxysmal anxiety) without agoraphobia F41.0 300.01   2. GEO (generalized anxiety disorder) F41.1 300.02       Strengths and Liabilities: Strength: Patient accepts guidance/feedback, Strength: Patient is expressive/articulate., Strength: Patient is intelligent., Strength: Patient is motivated for change., Strength: Patient is physically healthy., Strength:  Patient has positive support network., Strength: Patient has reasonable judgment., Strength: Patient is stable.    Treatment Goals:  Specify outcomes written in observable, behavioral terms:   Anxiety: acquiring relapse prevention skills and reducing physical symptoms of anxiety    Treatment Plan/Recommendations:   · Medication Management: Continue current medications. The risks and benefits of medication were discussed with the patient.  · The treatment plan and follow up plan were reviewed with the patient.     Refer to Dr Kahn for anxiety reduction techniques       Return to Clinic: 2 weeks    Counseling time: 40 mins   Total time: 60 mins   Consulting clinician was informed of the encounter and consult note.

## 2017-02-04 LAB
ALDOST SERPL-MCNC: 5.5 NG/DL
ALDOST/RENIN PLAS-RTO: 5 RATIO
RENIN PLAS-CCNC: 1.1 NG/ML/HR

## 2017-02-06 LAB — ALDOST SERPL-MCNC: 4.2 NG/DL

## 2017-02-07 ENCOUNTER — PATIENT MESSAGE (OUTPATIENT)
Dept: ENDOCRINOLOGY | Facility: CLINIC | Age: 57
End: 2017-02-07

## 2017-02-07 ENCOUNTER — TELEPHONE (OUTPATIENT)
Dept: ENDOCRINOLOGY | Facility: CLINIC | Age: 57
End: 2017-02-07

## 2017-02-07 DIAGNOSIS — I10 ESSENTIAL HYPERTENSION: ICD-10-CM

## 2017-02-07 DIAGNOSIS — I16.0 HYPERTENSIVE URGENCY: Primary | ICD-10-CM

## 2017-02-07 LAB
METANEPH FREE SERPL-MCNC: <25 PG/ML
METANEPHS SERPL-MCNC: 57 PG/ML
NORMETANEPH FREE SERPL-MCNC: 57 PG/ML

## 2017-02-07 NOTE — TELEPHONE ENCOUNTER
Unable to reach pt via phone.   Message sent on pt poral     Would like to repeat renin and mishel.     Thank you   Yvonne Mcgowan DO   Endocrinology Fellow   D/w Dr. Elizabeth

## 2017-02-08 ENCOUNTER — TELEPHONE (OUTPATIENT)
Dept: ENDOCRINOLOGY | Facility: CLINIC | Age: 57
End: 2017-02-08

## 2017-02-08 NOTE — TELEPHONE ENCOUNTER
Spoke with pt, states he is doing well overall.   BG has been perfect at home 's   On the same BP medications Norvasc 10 mg toprol 100 mg   States he was started on xanax and klonopin and has been feeling much better overall.     Explained to pt that labs obtained in the hospital appear normal, however since he received saline while in the hospital it can interfere with the results.     Will check renin and mishel again   To be scheduled per pt convenience.   Pt to contact us back for any further questions or issues with BP.    Thank you   Yvonne Mcgowan DO   Endocrinology Fellow

## 2017-02-08 NOTE — TELEPHONE ENCOUNTER
Patient cancelled follow up visit and also sent a patient portal message asking you or Dr Elizabeth to call him to discuss results.

## 2017-02-09 LAB — RENIN PLAS-CCNC: 1.2 NG/ML/H

## 2017-02-13 DIAGNOSIS — R07.9 CHEST PAIN, UNSPECIFIED TYPE: Primary | ICD-10-CM

## 2017-02-14 ENCOUNTER — PATIENT MESSAGE (OUTPATIENT)
Dept: PSYCHIATRY | Facility: CLINIC | Age: 57
End: 2017-02-14

## 2017-03-08 ENCOUNTER — OFFICE VISIT (OUTPATIENT)
Dept: OPHTHALMOLOGY | Facility: CLINIC | Age: 57
End: 2017-03-08
Payer: COMMERCIAL

## 2017-03-08 DIAGNOSIS — H43.812 SYMPTOMATIC POSTERIOR VITREOUS DETACHMENT OF LEFT EYE: ICD-10-CM

## 2017-03-08 PROCEDURE — 92014 COMPRE OPH EXAM EST PT 1/>: CPT | Mod: S$GLB,,, | Performed by: OPHTHALMOLOGY

## 2017-03-08 PROCEDURE — 99999 PR PBB SHADOW E&M-EST. PATIENT-LVL II: CPT | Mod: PBBFAC,,, | Performed by: OPHTHALMOLOGY

## 2017-03-08 NOTE — MR AVS SNAPSHOT
[facility] - Ophthalmology  [address]  [facility] [address]  Phone: [phone]  Fax: [phone]                  Steve Carranza   3/8/2017 11:00 AM   Office Visit    Description:  Male : 1960   Provider:  Jaren Rudd MD   Department:  [facility] - Ophthalmology           Reason for Visit     Spots and/or Floaters           Diagnoses this Visit        Comments    Symptomatic posterior vitreous detachment of left eye                To Do List           Goals (5 Years of Data)     None      Follow-Up and Disposition     Return in about 1 month (around 2017).      [facility]     [facility] -  Assistance  Registered nurses in the [facility] provide clinical advisement, health education, appointment booking, and other advisory services.  Call for this free service at [phone].             Medications           Message regarding Medications     Verify the changes and/or additions to your medication regime listed below are the same as discussed with your clinician today.  If any of these changes or additions are incorrect, please notify your healthcare provider.             Verify that the below list of medications is an accurate representation of the medications you are currently taking.  If none reported, the list may be blank. If incorrect, please contact your healthcare provider. Carry this list with you in case of emergency.           Current Medications     amlodipine (NORVASC) 10 MG tablet Take 10 mg by mouth once daily.    aspirin (ECOTRIN) 81 MG EC tablet Take 81 mg by mouth once daily.    clonazePAM (KLONOPIN) 0.5 MG tablet Take 1 tablet (0.5 mg total) by mouth 3 (three) times daily.    metoprolol succinate (TOPROL-XL) 100 MG 24 hr tablet Take 1 tablet (100 mg total) by mouth 2 (two) times daily.    multivitamin capsule Take 1 capsule by mouth once daily.    omeprazole (PRILOSEC) 20 MG capsule Take 1 capsule (20 mg total) by mouth  once daily. As needed    alprazolam (XANAX) 0.25 MG tablet Take 1 tablet (0.25 mg total) by mouth 3 (three) times daily.    sertraline (ZOLOFT) 50 MG tablet Take 1 tablet (50 mg total) by mouth once daily.           Clinical Reference Information           Allergies as of 3/8/2017     Penicillins      Immunizations Administered on Date of Encounter - 3/8/2017     None      Instructions    Given warning signs of retinal detachment.  Return in one month.       Language Assistance Services     ATTENTION: Language assistance services are available, free of charge. Please call [phone].      ATENCIÓN: Si habla eri, tiene a toney disposición servicios gratuitos de asistencia lingüística. Llame al [phone].     CHÚ Ý: N?u b?n nói Ti?ng Vi?t, có các d?ch v? h? tr? ngôn ng? mi?n phí dành cho b?n. G?i s? [phone].         [facility] complies with applicable Federal civil rights laws and does not discriminate on the basis of race, color, national origin, age, disability, or sex.

## 2017-03-08 NOTE — PROGRESS NOTES
HPI     Triage pt   Patient states OS suddenly seeing a floater in vision that moves left to   right.  No pain. No flashes.  Using OTC readers.    I have personally interviewed the patient, reviewed the history and   examined the patient and agree with the technician's exam.       Last edited by Jaren Rudd MD on 3/8/2017 11:06 AM.     ROS     Positive for: Gastrointestinal, Musculoskeletal, Cardiovascular    Negative for: Constitutional, Neurological, Skin, Genitourinary, HENT,   Endocrine, Eyes, Respiratory, Psychiatric, Allergic/Imm, Heme/Lymph    Last edited by Jaren Rudd MD on 3/8/2017 11:06 AM. (History)        Assessment /Plan     For exam results, see Encounter Report.    Symptomatic posterior vitreous detachment of left eye    I discussed the warning signs of a retinal detachment. No peripheral retinal traction or tear at this point. Repeat exam of left eye in one month.

## 2017-03-15 RX ORDER — CLONAZEPAM 0.5 MG/1
0.5 TABLET ORAL 3 TIMES DAILY
Qty: 90 TABLET | Refills: 1 | Status: SHIPPED | OUTPATIENT
Start: 2017-03-15 | End: 2017-08-14 | Stop reason: SDUPTHER

## 2017-06-26 ENCOUNTER — PATIENT MESSAGE (OUTPATIENT)
Dept: PSYCHIATRY | Facility: CLINIC | Age: 57
End: 2017-06-26

## 2017-06-26 ENCOUNTER — TELEPHONE (OUTPATIENT)
Dept: PSYCHIATRY | Facility: CLINIC | Age: 57
End: 2017-06-26

## 2017-08-14 RX ORDER — CLONAZEPAM 0.5 MG/1
0.5 TABLET ORAL 3 TIMES DAILY
Qty: 90 TABLET | Refills: 1 | Status: SHIPPED | OUTPATIENT
Start: 2017-08-14 | End: 2017-09-19 | Stop reason: SDUPTHER

## 2017-09-19 RX ORDER — CLONAZEPAM 0.5 MG/1
0.5 TABLET ORAL 3 TIMES DAILY
Qty: 90 TABLET | Refills: 1 | Status: SHIPPED | OUTPATIENT
Start: 2017-09-19 | End: 2018-01-18 | Stop reason: SDUPTHER

## 2017-12-01 ENCOUNTER — DOCUMENTATION ONLY (OUTPATIENT)
Dept: CARDIOLOGY | Facility: CLINIC | Age: 57
End: 2017-12-01

## 2017-12-01 NOTE — PROGRESS NOTES
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  He has recurrent allergic rhinitis which is manifest by tooth pain.  He has some relief with properly used zyrtec, flonase, and afrin.  Because it is a recurrent seasonal problem I would like to make sure there is not something else that I'm missing, so I've asked Dr. Garcia to see and evaluate him.

## 2017-12-05 ENCOUNTER — OFFICE VISIT (OUTPATIENT)
Dept: OTOLARYNGOLOGY | Facility: CLINIC | Age: 57
End: 2017-12-05
Payer: COMMERCIAL

## 2017-12-05 VITALS
HEART RATE: 68 BPM | WEIGHT: 225.06 LBS | BODY MASS INDEX: 32.76 KG/M2 | SYSTOLIC BLOOD PRESSURE: 143 MMHG | DIASTOLIC BLOOD PRESSURE: 91 MMHG

## 2017-12-05 DIAGNOSIS — H69.92 EUSTACHIAN TUBE DYSFUNCTION, LEFT: Primary | ICD-10-CM

## 2017-12-05 DIAGNOSIS — H93.12 TINNITUS OF LEFT EAR: ICD-10-CM

## 2017-12-05 DIAGNOSIS — R51.9 LEFT FACIAL PAIN: ICD-10-CM

## 2017-12-05 PROCEDURE — 31231 NASAL ENDOSCOPY DX: CPT | Mod: S$GLB,,, | Performed by: OTOLARYNGOLOGY

## 2017-12-05 PROCEDURE — 99999 PR PBB SHADOW E&M-EST. PATIENT-LVL III: CPT | Mod: PBBFAC,,, | Performed by: OTOLARYNGOLOGY

## 2017-12-05 PROCEDURE — 99244 OFF/OP CNSLTJ NEW/EST MOD 40: CPT | Mod: 25,S$GLB,, | Performed by: OTOLARYNGOLOGY

## 2017-12-05 NOTE — Clinical Note
December 5, 2017      Daniel Lozano MD  [address]  [facility] [address]           [facility] - Otorhinolaryngology  [address]  [facility] [address]  Phone: [phone]  Fax: [phone]          Patient: Steve Carranza   MR Number: [id number]   YOB: 1960   Date of Visit: 12/5/2017       Dear Dr. Daniel Lozano:    Thank you for referring Steve Carranza to me for evaluation. Attached you will find relevant portions of my assessment and plan of care.    If you have questions, please do not hesitate to call me. I look forward to following Steve Carranza along with you.    Sincerely,    Uvaldo Alford MD    Enclosure  CC:  No Recipients    If you would like to receive this communication electronically, please contact [email or web address] or [phone] to request more information on [facility] access.    For providers and/or their staff who would like to refer a patient to [facility], please contact us through our one-stop-shop provider referral line, [facility], at [phone].    If you feel you have received this communication in error or would no longer like to receive these types of communications, please e-mail [email or web address]

## 2017-12-05 NOTE — PROGRESS NOTES
"  Subjective:      Steve Carranza is a 57 y.o. male who was referred to me by Dr. Daniel Lozano in consultation for aural fullness.    He relates a recurrent history of left ear pain, pressure and headache that is associated with air travel.  He describes moderately-severe pressure in the left ear, inability to equilibrate, dizziness, worsening tinnitus, and pain radiating to the temple and infraauricular region.  This typically resolves after 1 to 2 weeks, and most recently occurred 10 days ago when traveling to [place] for a [employer] game.  He was given a medrol dose pack by the team doctor and now on day 4 feels "100% better."  On a previous occasion he was given a Z-chely, which provided improvement.  He has not used any nasal sprays or allergy medication.  He denies nasal blockage, facial pressure at baseline, rhinorrhea, postnasal drip, hyposmia, pruritic symptoms or notable sinus infections.  He denies allergy testing or history of asthma.  He denies prior nasal trauma or surgery.    SNOT-22 score = 33, NOSE score = 25%, ETDQ-7 score = 5.9    Global QOL = 65%    Days missed = Less than 5      Past Medical History  He has a past medical history of Hypertension.    Past Surgical History  He has a past surgical history that includes Knee surgery; Tonsillectomy; Refractive surgery; and Colonoscopy (06/2016).    Family History  His family history includes Glaucoma in his mother.    Social History  He reports that he has never smoked. He has never used smokeless tobacco. He reports that he drinks about 3.6 oz of alcohol per week . He reports that he does not use drugs.    Allergies  He is allergic to penicillins.    Medications   He has a current medication list which includes the following prescription(s): amlodipine, aspirin, clonazepam, metoprolol succinate, multivitamin, omeprazole, and beclomethasone dipropionate.    Review of Systems  Review of Systems   Constitutional: Negative for fatigue, fever and " unexpected weight change.   HENT: Positive for dental problem, ear pain, sinus pressure and tinnitus. Negative for congestion, ear discharge, facial swelling, hearing loss, hoarse voice, nosebleeds, postnasal drip, rhinorrhea, sore throat, trouble swallowing and voice change.    Eyes: Negative for photophobia, discharge, itching and visual disturbance.   Respiratory: Negative for apnea, cough, shortness of breath and wheezing.    Cardiovascular: Negative for chest pain and palpitations.   Gastrointestinal: Negative for abdominal pain, nausea and vomiting.   Endocrine: Negative for cold intolerance and heat intolerance.   Genitourinary: Negative for difficulty urinating.   Musculoskeletal: Negative for arthralgias, back pain, myalgias and neck pain.   Skin: Negative for rash.   Allergic/Immunologic: Negative for environmental allergies and food allergies.   Neurological: Positive for headaches. Negative for dizziness, seizures, syncope and weakness.   Hematological: Negative for adenopathy. Does not bruise/bleed easily.   Psychiatric/Behavioral: Negative for decreased concentration, dysphoric mood and sleep disturbance. The patient is not nervous/anxious.           Objective:     BP (!) 143/91   Pulse 68   Wt 102.1 kg (225 lb 1.4 oz)   BMI 32.76 kg/m²        Constitutional:   He appears well-developed. He is cooperative. Normal speech.  No hoarse voice.      Head:  Normocephalic. Salivary glands normal.  Facial strength is normal.      Ears:    Right Ear: No drainage or tenderness. Tympanic membrane is not perforated. Tympanic membrane mobility is normal. No middle ear effusion. No decreased hearing is noted.   Left Ear: No drainage or tenderness. Tympanic membrane is not perforated. Tympanic membrane mobility is normal.  No middle ear effusion. No decreased hearing is noted.     Nose:  No mucosal edema, rhinorrhea, septal deviation or polyps. No epistaxis. Turbinates normal, no turbinate masses and no turbinate  hypertrophy.  Right sinus exhibits no maxillary sinus tenderness and no frontal sinus tenderness. Left sinus exhibits no maxillary sinus tenderness and no frontal sinus tenderness.     Mouth/Throat  Oropharynx clear and moist without lesions or asymmetry and normal uvula midline. He does not have dentures. Normal dentition. No oral lesions or mucous membrane lesions. No oropharyngeal exudate or posterior oropharyngeal erythema. Mirror exam not performed due to patient tolerance.  Mirror exam not performed due to patient tolerance.      Neck:  Neck normal without thyromegaly masses, asymmetry, normal tracheal structure, crepitus, and tenderness, thyroid normal, trachea normal and no adenopathy. Normal range of motion present.     He has no cervical adenopathy.     Cardiovascular:   Regular rhythm.      Pulmonary/Chest:   Effort normal.     Psychiatric:   He has a normal mood and affect. His speech is normal and behavior is normal.     Neurological:   No cranial nerve deficit.     Skin:   No rash noted.       Procedure    Nasal endoscopy performed.  See procedure note.     Left nasal valve     Left MT     Left posterior nasal cavity     Left ET     Right nasal valve      Right MT     Right ET        Data Reviewed    WBC (K/uL)   Date Value   02/01/2017 6.47     Eosinophil% (%)   Date Value   02/01/2017 2.3     Eos # (K/uL)   Date Value   02/01/2017 0.2     Platelets (K/uL)   Date Value   02/01/2017 249     Glucose (mg/dL)   Date Value   02/01/2017 131 (H)     No results found for: IGE    No sinus imaging available.       Assessment:     1. Eustachian tube dysfunction, left    2. Tinnitus of left ear    3. Left facial pain         Plan:     I had a long discussion with the patient regarding his condition and the further workup and management options.  I reassured him as to the benign nature of today's findings, including the absence of an infectious process or mass lesion.  I prescribed Qnasl to capitalize on the  aerosol property which permits deposition in the posterior nasal cavity, which has not been adequately reached by linear nasal sprays such as Flonase.  I briefly mentioned that surgical management with balloon dilation of the eustachian tube may be considered.  Prior to that he would require CT imaging.    Return if symptoms worsen or fail to improve.

## 2017-12-05 NOTE — LETTER
2017    Daniel Lozano MD  [address] [place] [address]           OTOLARYNGOLOGY AND COMMUNICATION SCIENCES    Mark Castro MD, FACS          SURGICAL AND MEDICAL DISEASES OF HEAD AND NECK  MD Mark Pena MD, FACS  Vinnie Saavedra III, MD    OTOLOGY, NEUROTOLOGY and SKULL-BASE SURGERY  Rodolfo Dennis MD, FACS  Demetrius Bowles MD, Director    PEDIATRIC OTOLARYNGOLOGY & OTOLOGY  YELENA Barber MD, FAAP  Aakash Silva MD, FACS    FACIAL PLASTIC and RECONSTRUCTIVE SURGERY  STEFANIA Cox III, MD, FACS    RHINOLOGY and SINUS SURGERY  Uvaldo Alford MD, MPH, FACS  Director   STEFANIA Cox III, MD, FACS    LARYNGOLOGY  Ian Hayes MD    SPEECH-LANGUAGE PATHOLOGY  Valentina Aponte, PhD, [facility]-SLP  Bella Latham, MS, CCC-SLP  Linsey Tovar, MS, CCC-SLP  Jemima Sylvester MA, [facility]-SLP    AUDIOLOGY SECTION  Nithya Lenz, MS, CCC-A  FRANCES Rae, CCC-A  Cherelle Montana, Ankit, CCC-A  Ankit Lynch, CCC-A  Regino Michele Jr., FRANCES, CCA-A  FRANCES Loredo, CCC-A  Ankit Salinas, CCC-A    ADVANCED PRACTICE CLINICIANS  Head and Neck Surgical Oncology  HOUSTON Godinez, FNP-C  Pedatric Otolaryngology  HOUSTON Ryan, PNP-C       Re:  Steve Carranza  :  1960    Dear Dr. Lozano,    Thank you for referring your patient, Steve Carranza, to us for evaluation and treatment.  I have enclosed a copy of my clinic note for your review and records.  If you have any questions please do not hesitate to contact our office.     Thank you for allowing me to participate in the care of your patient.    Sincerely,        Uvaldo Alford MD, MPH, FACS    Director, Rhinology and Sinus Surgery  Department of Otorhinolaryngology  [facility]    Encl:  Progress note       [address] [address]  phone [phone]  fax [phone]   [email or web address]

## 2017-12-05 NOTE — PROCEDURES
Nasal/sinus endoscopy  Date/Time: 12/5/2017 12:06 PM  Performed by: ZULEMA MARTINEZ  Authorized by: ZULEMA MARTINEZ     Consent Done?:  Yes (Verbal)  Anesthesia:     Local anesthetic:  Lidocaine 4% and Janes-Synephrine 1/2%    Patient tolerance:  Patient tolerated the procedure well with no immediate complications  Nose:     Procedure Performed:  Nasal Endoscopy  External:      No external nasal deformity  Intranasal:      Mucosa no polyps     Mucosa ulcers not present     No mucosa lesions present     Turbinates not enlarged     No septum gross deformity  Nasopharynx:      No mucosa lesions     Adenoids not present     Posterior choanae patent     Eustachian tube patent     Mild edema of ET torus.  Mild nonpurulent exudate.  Adenoidectomy scar.

## 2017-12-29 ENCOUNTER — PATIENT MESSAGE (OUTPATIENT)
Dept: OTOLARYNGOLOGY | Facility: CLINIC | Age: 57
End: 2017-12-29

## 2018-01-18 ENCOUNTER — CLINICAL SUPPORT (OUTPATIENT)
Dept: INTERNAL MEDICINE | Facility: CLINIC | Age: 58
End: 2018-01-18

## 2018-01-18 ENCOUNTER — HOSPITAL ENCOUNTER (OUTPATIENT)
Dept: CARDIOLOGY | Facility: CLINIC | Age: 58
Discharge: HOME | End: 2018-01-18

## 2018-01-18 DIAGNOSIS — Z00.00 ROUTINE GENERAL MEDICAL EXAMINATION AT A HEALTH CARE FACILITY: Primary | ICD-10-CM

## 2018-01-18 DIAGNOSIS — Z00.00 ROUTINE GENERAL MEDICAL EXAMINATION AT A HEALTH CARE FACILITY: ICD-10-CM

## 2018-01-18 LAB
25(OH)D3+25(OH)D2 SERPL-MCNC: 19 NG/ML
ALBUMIN SERPL BCP-MCNC: 4.8 G/DL
ALP SERPL-CCNC: 64 U/L
ALT SERPL W/O P-5'-P-CCNC: 29 U/L
ANION GAP SERPL CALC-SCNC: ABNORMAL MMOL/L
AST SERPL-CCNC: 31 U/L
BILIRUB SERPL-MCNC: 1.2 MG/DL
BUN SERPL-MCNC: 16 MG/DL
CALCIUM SERPL-MCNC: 10.1 MG/DL
CHLORIDE SERPL-SCNC: 102 MMOL/L
CHOLEST SERPL-MCNC: 211 MG/DL
CHOLEST/HDLC SERPL: 3.9 {RATIO}
CO2 SERPL-SCNC: ABNORMAL MMOL/L
COMPLEXED PSA SERPL-MCNC: 0.36 NG/ML
CREAT SERPL-MCNC: 1.1 MG/DL
DIASTOLIC DYSFUNCTION: NO
ERYTHROCYTE [DISTWIDTH] IN BLOOD BY AUTOMATED COUNT: 12.4 %
EST. GFR  (AFRICAN AMERICAN): >60 ML/MIN/1.73 M^2
EST. GFR  (NON AFRICAN AMERICAN): >60 ML/MIN/1.73 M^2
GLUCOSE SERPL-MCNC: 97 MG/DL
HCT VFR BLD AUTO: 40.3 %
HDLC SERPL-MCNC: 54 MG/DL
HDLC SERPL: 25.6 %
HGB BLD-MCNC: 14.4 G/DL
LDLC SERPL CALC-MCNC: 137 MG/DL
MCH RBC QN AUTO: 32 PG
MCHC RBC AUTO-ENTMCNC: 35.7 G/DL
MCV RBC AUTO: 90 FL
NONHDLC SERPL-MCNC: 157 MG/DL
PLATELET # BLD AUTO: 286 K/UL
PMV BLD AUTO: 10.3 FL
POTASSIUM SERPL-SCNC: 3.9 MMOL/L
PROT SERPL-MCNC: 8.1 G/DL
RBC # BLD AUTO: 4.5 M/UL
SODIUM SERPL-SCNC: 139 MMOL/L
TRIGL SERPL-MCNC: 100 MG/DL
TSH SERPL DL<=0.005 MIU/L-ACNC: 3.83 UIU/ML
WBC # BLD AUTO: 6.24 K/UL

## 2018-01-18 PROCEDURE — 83036 HEMOGLOBIN GLYCOSYLATED A1C: CPT

## 2018-01-18 PROCEDURE — 85027 COMPLETE CBC AUTOMATED: CPT

## 2018-01-18 PROCEDURE — 84153 ASSAY OF PSA TOTAL: CPT

## 2018-01-18 PROCEDURE — 36415 COLL VENOUS BLD VENIPUNCTURE: CPT

## 2018-01-18 PROCEDURE — 80061 LIPID PANEL: CPT

## 2018-01-18 PROCEDURE — 84443 ASSAY THYROID STIM HORMONE: CPT

## 2018-01-18 PROCEDURE — 93015 CV STRESS TEST SUPVJ I&R: CPT | Mod: ,,, | Performed by: INTERNAL MEDICINE

## 2018-01-18 PROCEDURE — 82306 VITAMIN D 25 HYDROXY: CPT

## 2018-01-18 PROCEDURE — 80053 COMPREHEN METABOLIC PANEL: CPT

## 2018-01-18 RX ORDER — CLONAZEPAM 0.5 MG/1
0.5 TABLET ORAL 3 TIMES DAILY
Qty: 90 TABLET | Refills: 1 | Status: SHIPPED | OUTPATIENT
Start: 2018-01-18 | End: 2018-01-23 | Stop reason: SDUPTHER

## 2018-01-19 LAB
ESTIMATED AVG GLUCOSE: 74 MG/DL
HBA1C MFR BLD HPLC: 4.2 %

## 2018-01-23 ENCOUNTER — OFFICE VISIT (OUTPATIENT)
Dept: PSYCHIATRY | Facility: CLINIC | Age: 58
End: 2018-01-23
Payer: COMMERCIAL

## 2018-01-23 DIAGNOSIS — F41.1 GAD (GENERALIZED ANXIETY DISORDER): Primary | ICD-10-CM

## 2018-01-23 PROCEDURE — 99999 PR PBB SHADOW E&M-EST. PATIENT-LVL II: CPT | Mod: PBBFAC,,, | Performed by: PSYCHIATRY & NEUROLOGY

## 2018-01-23 PROCEDURE — 90833 PSYTX W PT W E/M 30 MIN: CPT | Mod: S$GLB,,, | Performed by: PSYCHIATRY & NEUROLOGY

## 2018-01-23 PROCEDURE — 99213 OFFICE O/P EST LOW 20 MIN: CPT | Mod: S$GLB,,, | Performed by: PSYCHIATRY & NEUROLOGY

## 2018-01-23 RX ORDER — CLONAZEPAM 0.5 MG/1
0.5 TABLET ORAL 3 TIMES DAILY
Qty: 90 TABLET | Refills: 5 | Status: SHIPPED | OUTPATIENT
Start: 2018-01-23 | End: 2018-03-29 | Stop reason: SDUPTHER

## 2018-01-23 NOTE — PROGRESS NOTES
Outpatient Psychiatry Initial Visit (MD/NP)    2018    Steve Carranza, a 57 y.o. male, presenting for initial evaluation visit. Met with patient.    Reason for Encounter: Consult from Dr Nathanael Lozano. Patient complains of   Chief Complaint   Patient presents with    Anxiety   .    History of Present Illness: Anxiety  Patient is here for follow up  of anxiety.  He had the following anxiety symptoms worsening  in 2017 : chest pain, difficulty concentrating, dizziness, fatigue, insomnia, irritable, palpitations, paresthesias, psychomotor agitation, racing thoughts, sweating. Onset of symptoms was approximately 11 years ago.  Symptoms have been improving since 2017 . He denies current suicidal and homicidal ideation. Family history significant for no psychiatric illness.Possible organic causes contributing are: none. Risk factors: none Previous treatment includes medication Xanax.   He complains of the following medication side effects: none.    [employer] ; does worry excessively about his kids ; health and mortality muscle tension , irrit; fatugue     Dad  of alc liver dz at 50; mom at 82 and alive     2 days prior to meeting me in early   had am mtg here then [employer] with nausea and tension ;  / 100 ; responded to 0.25 mg xanax then again yesterday and did well.     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 [employer]  Sees Dr Rodríguez  - mild ADD dx ;       15 yrs to 6yr younger wife , Shanae , expert  out of director boxing  And various       Review Of Systems:     GENERAL:  No weight gain or loss  SKIN:  No rashes or lacerations  HEAD:  No headaches  EYES:  No exophthalmos, jaundice or blindness  EARS:  No dizziness, tinnitus or hearing loss  NOSE:  No changes in smell  MOUTH & THROAT:  No dyskinetic movements or  obvious goiter  CHEST:  No shortness of breath, hyperventilation or cough  CARDIOVASCULAR:  No tachycardia or chest pain  ABDOMEN:  No nausea, vomiting, pain, constipation or diarrhea  URINARY:  No frequency, dysuria or sexual dysfunction  ENDOCRINE:  No polydipsia, polyuria  MUSCULOSKELETAL:  No pain or stiffness of the joints  NEUROLOGIC:  No weakness, sensory changes, seizures, confusion, memory loss, tremor or other abnormal movements    Current Evaluation:     Nutritional Screening: Considering the patient's height and weight, medications, medical history and preferences, should a referral be made to the dietitian? no    Constitutional  Vitals:  Most recent vital signs, dated less than 90 days prior to this appointment, were reviewed.    There were no vitals filed for this visit.     General:  unremarkable, age appropriate, casually dressed, neatly groomed     Musculoskeletal  Muscle Strength/Tone:  no spasicity, no rigidity, no flaccidity   Gait & Station:  non-ataxic     Psychiatric  Speech:  no latency; no press   Mood & Affect:  anxious  congruent and appropriate   Thought Process:  normal and logical   Associations:  intact   Thought Content:  normal, no suicidality, no homicidality, delusions, or paranoia   Insight:  has awareness of illness   Judgement: behavior is adequate to circumstances   Orientation:  grossly intact   Memory: intact for content of interview   Language: grossly intact   Attention Span & Concentration:  able to focus   Fund of Knowledge:  intact and appropriate to age and level of education       Relevant Elements of Neurological Exam: normal gait    Functioning in Relationships:  Spouse/partner:  ; fa of 2   Peers: has support   Employers: [employer]     Laboratory Data  [facility] Out[facility] Visit on 01/18/2018   Component Date Value Ref Range Status    Diastolic Dysfunction 01/18/2018 No   Final   Clinical Support on 01/18/2018   Component Date Value Ref Range  Status    Sodium 01/18/2018 139  136 - 145 mmol/L Final    Potassium 01/18/2018 3.9  3.5 - 5.1 mmol/L Final    Chloride 01/18/2018 102  95 - 110 mmol/L Final    CO2 01/18/2018 SEE COMMENT  23 - 29 mmol/L Final    Glucose 01/18/2018 97  70 - 110 mg/dL Final    BUN, Bld 01/18/2018 16  6 - 20 mg/dL Final    Creatinine 01/18/2018 1.1  0.5 - 1.4 mg/dL Final    Calcium 01/18/2018 10.1  8.7 - 10.5 mg/dL Final    Total Protein 01/18/2018 8.1  6.0 - 8.4 g/dL Final    Albumin 01/18/2018 4.8  3.5 - 5.2 g/dL Final    Total Bilirubin 01/18/2018 1.2* 0.1 - 1.0 mg/dL Final    Alkaline Phosphatase 01/18/2018 64  55 - 135 U/L Final    AST 01/18/2018 31  10 - 40 U/L Final    ALT 01/18/2018 29  10 - 44 U/L Final    Anion Gap 01/18/2018 SEE COMMENT  8 - 16 mmol/L Final    eGFR if African American 01/18/2018 >60.0  >60 mL/min/1.73 m^2 Final    eGFR if non African American 01/18/2018 >60.0  >60 mL/min/1.73 m^2 Final    WBC 01/18/2018 6.24  3.90 - 12.70 K/uL Final    RBC 01/18/2018 4.50* 4.60 - 6.20 M/uL Final    Hemoglobin 01/18/2018 14.4  14.0 - 18.0 g/dL Final    Hematocrit 01/18/2018 40.3  40.0 - 54.0 % Final    MCV 01/18/2018 90  82 - 98 fL Final    MCH 01/18/2018 32.0* 27.0 - 31.0 pg Final    MCHC 01/18/2018 35.7  32.0 - 36.0 g/dL Final    RDW 01/18/2018 12.4  11.5 - 14.5 % Final    Platelets 01/18/2018 286  150 - 350 K/uL Final    MPV 01/18/2018 10.3  9.2 - 12.9 fL Final    Cholesterol 01/18/2018 211* 120 - 199 mg/dL Final    Triglycerides 01/18/2018 100  30 - 150 mg/dL Final    HDL 01/18/2018 54  40 - 75 mg/dL Final    LDL Cholesterol 01/18/2018 137.0  63.0 - 159.0 mg/dL Final    HDL/Chol Ratio 01/18/2018 25.6  20.0 - 50.0 % Final    Total Cholesterol/HDL Ratio 01/18/2018 3.9  2.0 - 5.0 Final    Non-HDL Cholesterol 01/18/2018 157  mg/dL Final    TSH 01/18/2018 3.827  0.400 - 4.000 uIU/mL Final    PSA, SCREEN 01/18/2018 0.36  0.00 - 4.00 ng/mL Final    Hemoglobin A1C 01/18/2018 4.2  4.0 - 5.6 %  Final    Estimated Avg Glucose 01/18/2018 74  68 - 131 mg/dL Final    Vit D, 25-Hydroxy 01/18/2018 19* 30 - 96 ng/mL Final         Medications  Outpatient Encounter Prescriptions as of 1/23/2018   Medication Sig Dispense Refill    amlodipine (NORVASC) 10 MG tablet Take 10 mg by mouth once daily.      aspirin (ECOTRIN) 81 MG EC tablet Take 81 mg by mouth once daily.      clonazePAM (KLONOPIN) 0.5 MG tablet Take 1 tablet (0.5 mg total) by mouth 3 (three) times daily. 90 tablet 1    metoprolol succinate (TOPROL-XL) 100 MG 24 hr tablet Take 1 tablet (100 mg total) by mouth 2 (two) times daily. 180 tablet 4    multivitamin capsule Take 1 capsule by mouth once daily.      omeprazole (PRILOSEC) 20 MG capsule Take 1 capsule (20 mg total) by mouth once daily. As needed 60 capsule 0     No facility-administered encounter medications on file as of 1/23/2018.            Assessment - Diagnosis - Goals:     Impression: anxiety    No diagnosis found.    Strengths and Liabilities: Strength: Patient accepts guidance/feedback, Strength: Patient is expressive/articulate., Strength: Patient is intelligent., Strength: Patient is motivated for change., Strength: Patient is physically healthy., Strength: Patient has positive support network., Strength: Patient has reasonable judgment., Strength: Patient is stable.    Treatment Goals:  Specify outcomes written in observable, behavioral terms:   Anxiety: acquiring relapse prevention skills and reducing physical symptoms of anxiety    Treatment Plan/Recommendations:   · Medication Management: Continue current medications. The risks and benefits of medication were discussed with the patient.  · The treatment plan and follow up plan were reviewed with the patient.     Refer to Dr Kahn for anxiety reduction techniques       Return to Clinic: 2 weeks    Counseling time: 40 mins   Total time: 60 mins   Consulting clinician was informed of the encounter and consult note.

## 2018-01-25 NOTE — PROGRESS NOTES
Outpatient Psychiatry Follow-Up Visit (MD/NP)    2018    Clinical Status of Patient:  Outpatient (Ambulatory)    Chief Complaint:  Steve Carranza is a 57 y.o. male who presents today for follow-up of anxiety.  Met with patient.  SR M[facility] of [facility] for [place] [facility] Interval History and Content of Current Session:  Interim Events/Subjective Report/Content of Current Session:     Pt seen initially in 2017 .   History of Present Illness: Anxiety  Patient is here for follow up  of anxiety.  He presented initially n  with  the following anxiety symptoms: chest pain, difficulty concentrating, dizziness, fatigue, insomnia, irritable, palpitations, paresthesias, psychomotor agitation, racing thoughts, sweating. Onset of symptoms was approximately in .  Symptoms have been improving  since  2017 . He denies current suicidal and homicidal ideation. Family history significant for no psychiatric illness.Possible organic causes contributing are: none. Risk factors: none Previous treatment includes medication Xanax.   He complains of the following medication side effects: none.     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 [facility]  Sees Dr Rodríguez  - mild ADD dx ;         to  to 6yr younger wife , Shanae, who is an expert physicval fitmess  out of [employer] and other various locations       Psychotherapy:  · Target symptoms: anxiety , work stress  · Why chosen therapy is appropriate versus another modality: relevant to diagnosis, patient responds to this modality,  evidence based practice  · Outcome monitoring methods: self-report, observation  · Therapeutic intervention type: supportive psychotherapy  · Topics discussed/themes: work stress, stress related to medical comorbidities, symptom recognition  · The patient's response to the intervention is accepting. The patient's progress toward treatment goals is excellent.   · Duration of intervention: 25 minutes.    Review of Systems   · Prob Pert. 1 sys, Ext. psych +2 add., Comp. 10-14 sys  · PSYCHIATRIC: Pertinant items are noted in the narrative.  · CONSTITUTIONAL: No weight gain or loss.   · MUSCULOSKELETAL: No pain or stiffness of the joints.  · NEUROLOGIC: No weakness, sensory changes, seizures, confusion, memory loss, tremor or other abnormal movements.  · ENDOCRINE: No polydipsia or polyuria.  · INTEGUMENTARY: No rashes or lacerations.  · EYES: No exophthalmos, jaundice or blindness.  · ENT: No dizziness, tinnitus or hearing loss.  · RESPIRATORY: No shortness of breath.  · CARDIOVASCULAR: No tachycardia or chest pain.  · GASTROINTESTINAL: No nausea, vomiting, pain, constipation or diarrhea.  · GENITOURINARY: No frequency, dysuria or sexual dysfunction.  · HEMATOLOGIC/LYMPHATIC: No excessive bleeding, prolonged or excessive bleeding after dental extraction/injury.  · ALLERGIC/IMMUNOLOGIC: No allergic response to materials, foods or animals at this time.    Past Medical, Family and Social History: The patient's past medical, family and social history have been reviewed and updated as appropriate within the electronic medical record - see encounter notes.    Compliance: yes    Side effects: None    Risk Parameters:  Patient reports no suicidal ideation  Patient reports no homicidal ideation  Patient reports no self-injurious behavior  Patient reports no violent behavior    Exam (detailed: at least 9 elements; comprehensive: all 15 elements)   Constitutional  Vitals:  Most recent vital signs, dated less than 90 days prior to  this appointment, were reviewed.   There were no vitals filed for this visit.     General:  unremarkable, age appropriate, normal weight, casually dressed, neatly groomed     Musculoskeletal  Muscle Strength/Tone:  no spasicity, no rigidity, no flaccidity   Gait & Station:  non-ataxic     Psychiatric  Speech:  no latency; no press   Mood & Affect:  euthymic  congruent and appropriate   Thought Process:  normal and logical   Associations:  intact   Thought Content:  normal, no suicidality, no homicidality, delusions, or paranoia   Insight:  has awareness of illness   Judgement: behavior is adequate to circumstances   Orientation:  grossly intact   Memory: intact for content of interview   Language: grossly intact   Attention Span & Concentration:  able to focus   Fund of Knowledge:  intact and appropriate to age and level of education     Assessment and Diagnosis   Status/Progress: Based on the examination today, the patient's problem(s) is/are improved.  New problems have not been presented today.   Co-morbidities are not complicating management of the primary condition.  There are no active rule-out diagnoses for this patient at this time.     General Impression: improved       ICD-10-CM ICD-9-CM   1. GEO (generalized anxiety disorder) F41.1 300.02       Intervention/Counseling/Treatment Plan   · Medication Management: Continue current medications. The risks and benefits of medication were discussed with the patient.  · Counseling provided with patient as follows: importance of compliance with chosen treatment options was emphasized, risks and benefits of treatment options, including medications, were discussed with the patient     Referred to Dr Kahn in past though he did not follow through       Return to Clinic: 6 months

## 2018-02-01 RX ORDER — METOPROLOL SUCCINATE 100 MG/1
TABLET, EXTENDED RELEASE ORAL
Qty: 90 TABLET | Refills: 0 | Status: SHIPPED | OUTPATIENT
Start: 2018-02-01 | End: 2019-01-23 | Stop reason: SDUPTHER

## 2018-03-29 ENCOUNTER — OFFICE VISIT (OUTPATIENT)
Dept: PSYCHIATRY | Facility: CLINIC | Age: 58
End: 2018-03-29
Payer: COMMERCIAL

## 2018-03-29 DIAGNOSIS — F41.1 GAD (GENERALIZED ANXIETY DISORDER): Primary | ICD-10-CM

## 2018-03-29 PROCEDURE — 90833 PSYTX W PT W E/M 30 MIN: CPT | Mod: S$GLB,,, | Performed by: PSYCHIATRY & NEUROLOGY

## 2018-03-29 PROCEDURE — 99213 OFFICE O/P EST LOW 20 MIN: CPT | Mod: S$GLB,,, | Performed by: PSYCHIATRY & NEUROLOGY

## 2018-03-29 PROCEDURE — 99999 PR PBB SHADOW E&M-EST. PATIENT-LVL I: CPT | Mod: PBBFAC,,, | Performed by: PSYCHIATRY & NEUROLOGY

## 2018-03-29 RX ORDER — CLONAZEPAM 0.5 MG/1
0.5 TABLET ORAL 3 TIMES DAILY
Qty: 90 TABLET | Refills: 5 | Status: SHIPPED | OUTPATIENT
Start: 2018-03-29 | End: 2018-05-17 | Stop reason: SDUPTHER

## 2018-03-29 RX ORDER — ESCITALOPRAM OXALATE 5 MG/1
5 TABLET ORAL DAILY
Qty: 30 TABLET | Refills: 1 | Status: SHIPPED | OUTPATIENT
Start: 2018-03-29 | End: 2018-04-23 | Stop reason: SDUPTHER

## 2018-03-29 NOTE — PROGRESS NOTES
Outpatient Psychiatry Follow-Up Visit (MD/NP)    3/29/2018    Clinical Status of Patient:  Outpatient (Ambulatory)    Chief Complaint:  Steve Carranza is a 57 y.o. male who presents today for follow-up of anxiety.  Met with patient.  [facility] [place] [facility] Interval History and Content of Current Session:  Interim Events/Subjective Report/Content of Current Session:     Pt seen initially in 2017 .   History of Present Illness: Anxiety  Patient is here for follow up  of anxiety.  He presented initially in 2017 with  the following anxiety symptoms: chest pain, difficulty concentrating, dizziness, fatigue, insomnia, irritable, palpitations, paresthesias, psychomotor agitation, racing thoughts, sweating. Onset of symptoms was approximately in .  Symptoms have been improving  since  2017 . He denies current suicidal and homicidal ideation. Family history significant for no psychiatric illness.Possible organic causes contributing are: none. Risk factors: none Previous treatment includes medication Xanax.   He complains of the following medication side effects: none.     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         KIDS:   16 yo dtr , kalen Bradley; indep thinker ; former [employer] ; rides horse      10 yo timothyErvin [employer]ear[employer],  ? [employer]  Sees Dr Rodríguez  - mild ADD dx         to  to 6yr younger wife , Shanae, who is an expert physicval fitmess  out of [employer] and other various locations     First wife , her 2nd .  . Lots of time with kids . No time alone with each other .       Klonopin prn event or insomnia      Psychiatric Review  Of Systems - Is patient experiencing or having changes in:  sleep: global insomnia with stress   appetite: so so   weight: no , stable   energy/anergy: no  interest/pleasure/anhedonia:  Busy but not enjoying   somatic symptoms: no  libido: no  anxiety/panic: no panic , anxiety controlled with clonazepam   guilty/hopelessness: self esteem issues outside of office   concentration: not sure , some difficulty with casual reading   S.I.B.s/risky behavior: no  Irritability: yes but on small scale   Racing thoughts: yes , worried thoughts   Impulsive behaviors: no  Paranoia: no  AVH: no      1/2 cup of coffee q day ; lots of water       Psychotherapy:  · Target symptoms: anxiety , work stress  · Why chosen therapy is appropriate versus another modality: relevant to diagnosis, patient responds to this modality, evidence based practice  · Outcome monitoring methods: self-report, observation  · Therapeutic intervention type: supportive psychotherapy  · Topics discussed/themes: work stress, stress related to medical comorbidities, symptom recognition  · The patient's response to the intervention is accepting. The patient's progress toward treatment goals is excellent.   · Duration of intervention: 25 minutes.    Review of Systems   · Prob Pert. 1 sys, Ext. psych +2 add., Comp. 10-14 sys  · PSYCHIATRIC: Pertinant items are noted in the narrative.  · CONSTITUTIONAL: No weight gain or loss.   · MUSCULOSKELETAL: No pain or stiffness of the joints.  · NEUROLOGIC: No weakness, sensory changes, seizures, confusion, memory loss, tremor or other abnormal movements.  · ENDOCRINE: No polydipsia or polyuria.  · INTEGUMENTARY: No rashes or lacerations.  · EYES: No exophthalmos, jaundice or blindness.  · ENT: No dizziness, tinnitus or hearing loss.  · RESPIRATORY: No shortness of breath.  · CARDIOVASCULAR: No tachycardia or chest pain.  · GASTROINTESTINAL: No nausea, vomiting, pain, constipation or diarrhea.  · GENITOURINARY: No frequency,  dysuria or sexual dysfunction.  · HEMATOLOGIC/LYMPHATIC: No excessive bleeding, prolonged or excessive bleeding after dental extraction/injury.  · ALLERGIC/IMMUNOLOGIC: No allergic response to materials, foods or animals at this time.    Past Medical, Family and Social History: The patient's past medical, family and social history have been reviewed and updated as appropriate within the electronic medical record - see encounter notes.    Compliance: yes    Side effects: None    Risk Parameters:  Patient reports no suicidal ideation  Patient reports no homicidal ideation  Patient reports no self-injurious behavior  Patient reports no violent behavior    Exam (detailed: at least 9 elements; comprehensive: all 15 elements)   Constitutional  Vitals:  Most recent vital signs, dated less than 90 days prior to this appointment, were reviewed.   There were no vitals filed for this visit.     General:  unremarkable, age appropriate, normal weight, casually dressed, neatly groomed     Musculoskeletal  Muscle Strength/Tone:  no spasicity, no rigidity, no flaccidity   Gait & Station:  non-ataxic     Psychiatric  Speech:  no latency; no press   Mood & Affect:  euthymic  congruent and appropriate   Thought Process:  normal and logical   Associations:  intact   Thought Content:  normal, no suicidality, no homicidality, delusions, or paranoia   Insight:  has awareness of illness   Judgement: behavior is adequate to circumstances   Orientation:  grossly intact   Memory: intact for content of interview   Language: grossly intact   Attention Span & Concentration:  able to focus   Fund of Knowledge:  intact and appropriate to age and level of education     Assessment and Diagnosis   Status/Progress: Based on the examination today, the patient's problem(s) is/are improved.  New problems have not been presented today.   Co-morbidities are not complicating management of the primary condition.  There are no active rule-out diagnoses for  this patient at this time.     General Impression: improved       ICD-10-CM ICD-9-CM   1. GEO (generalized anxiety disorder) F41.1 300.02       Intervention/Counseling/Treatment Plan   · Medication Management: Continue current medications. The risks and benefits of medication were discussed with the patient.  · Counseling provided with patient as follows: importance of compliance with chosen treatment options was emphasized, risks and benefits of treatment options, including medications, were discussed with the patient     Referred to Dr Kahn in past though he did not follow through       Return to Clinic: 6 months

## 2018-04-16 ENCOUNTER — OFFICE VISIT (OUTPATIENT)
Dept: PSYCHIATRY | Facility: CLINIC | Age: 58
End: 2018-04-16
Payer: COMMERCIAL

## 2018-04-16 DIAGNOSIS — F41.1 GENERALIZED ANXIETY DISORDER: Primary | ICD-10-CM

## 2018-04-16 DIAGNOSIS — F41.0 PANIC DISORDER WITHOUT AGORAPHOBIA: ICD-10-CM

## 2018-04-16 PROCEDURE — 90791 PSYCH DIAGNOSTIC EVALUATION: CPT | Mod: S$GLB,,, | Performed by: PSYCHOLOGIST

## 2018-04-16 NOTE — PROGRESS NOTES
Psychiatry Initial Visit (PhD/LCSW)    CPT Code: 32567    Patient Name:  Steve Carranza    Date:  04/16/2018    Site:  [facility]    Referral source:  Henri Queen MD    Chief complaint/reason for encounter:  Psychological Evaluation    Clinical status of patient:  Outpatient    Met with:  Patient    History of present illness:  Mr. Steve Carranza is a 57-year-old White  male who is pursuing psychotherapy to improve anxiety.  He was first evaluated in the [facility] Department of Psychiatry on 02/03/2017 by psychiatrist Henri Queen MD.  He was diagnosed with Panic Disorder without agoraphobia and Generalized Anxiety Disorder (GEO).  He presents to therapy at this time due to anxiety and marital issues.  He and his wife have started experiencing marital issues with loss of intimacy for the past 10 years, but had been operating as a business until recently.  He notes that he and his wife are not on the same page.  They have considered separation and divorce.  They have started attending marriage counseling sessions, but he is unsure whether he wants to pursue the marriage.  Their counselor suggested they try to rekindle the relationship, but he notes that neither of us know how to do that.  Both he and his wife have met with their  and they will discuss future plans when he returns from a business trip on Wednesday.  Since their discussions about divorce, he has had difficulty concentrating and functioning - which is especially problematic for him at work.  He is motivated to attend therapy to clear my mind and process thoughts and feelings about his relationship, his life, and his future.    Pain scale:  N/A    Medical history:  Essential hypertension; GERD    Psychiatric symptoms:  Depression:  Denied.  He denied episodes of depressed mood or depression-related anhedonia.  He denied suicidal ideation.  Anne Marie/Hypomania:  Denied.  He denied periods of elevated mood or abnormally  increased energy or goal-directed activity.  Anxiety:  He reports symptoms of anxiety starting approximately 12 years ago.  He worries about his children, his health and mortality, and his marriage.  He has difficulty tolerating uncertainty and lack of control.  He started seeing Dr. Queen due to physical issues like high blood pressure, weight gain, and other medical issues.  He described himself as black and white, no grey, lets fix it, Type A, go-go-go.    Thoughts:  Denied delusions, hallucinations.  Suicidal thoughts/behaviors:  Denied.  Self-injury:  Denied.    Current psychosocial stressors:  Marital issues, Work, Family worries  Report of coping skills:  Spending time with friends and family; Involvement in work and work-related activities  Support system:  Friends, Family  Strengths and Liabilities:  Strength: Patient accepts guidance/feedback, Strength: Patient is expressive/articulate., Strength: Patient is intelligent., Strength: Patient is motivated for change., Strength: Patient is physically healthy., Strength: Patient has positive support network., Strength: Patient has reasonable judgment., Strength: Patient is stable., Liability: Patient lacks coping skills.    Current and past substance use:  Alcohol:  Rare current use.  Denied history of abuse or dependency.  Drugs:  Denied current use.  Denied history of abuse or dependency.  Tobacco:  Denied current use.  Caffeine:  Occasional current use.    Psychiatric history:  Previous diagnosis:  Panic Disorder without agoraphobia; GEO  Previous hospitalizations:  Denied.  History of outpatient treatment:  He is currently prescribed Lexapro and Klonopin by his psychiatrist, Henri Queen MD ([facility]).  He is currently seeing a marriage counselor with his wife.  Previous suicide attempt:  Denied.    Trauma history:  Denied.    Family history of psychiatric illness:  His son is diagnosed with mild ADHD.    Social history (marriage, employment,  etc.):  Mr. Carranza described his upbringing as somewhat dysfunctional.  He denied childhood trauma and abuse.  He is currently employed as the  for the [employer].  He is not on disability and finances are stable.  He has been  to his wife for 16 years.  He has two children (ages 13, 10).  He currently lives with his wife and children.  Rastafari.    Legal history:  Denied.    Mental Status Exam:  General appearance:  appears stated age, neatly dressed, well groomed  Speech:  normal rate, normal tone, normal pitch, normal volume  Level of cooperation:  cooperative  Thought processes:  logical, goal-directed  Mood:  mildly anxious  Thought content:  no illusions, no visual hallucinations, no auditory hallucinations, no delusions, no active or passive homicidal thoughts, no active or passive suicidal ideation, no obsessions, no compulsions, no violence  Affect:  appropriate  Orientation:  oriented to person, place, and date  Memory:  Recent memory:  3 of 3 objects after brief delay.    Remote memory - intact  Attention span and concentration:  spelled HOUSE forward and backwards  Fund of general knowledge: 3 of 3 recent presidents  Abstract reasoning:  similarities: abstract.  Proverbs: abstract.  Judgment and insight: fair  Language:  intact    Diagnostic impression:    ICD-10-CM ICD-9-CM   1. Generalized anxiety disorder F41.1 300.02   2. Panic disorder without agoraphobia F41.0 300.01           Plan:  Mr. Carranza will continue in psychotherapy with Prema Kahn, Ph.D. to address issues related to anxiety and marital issues.  He was provided with a worksheet on values and a copy of Get Out of Your Mind and Into Your Life.      Length of Session:  60 minutes

## 2018-05-08 RX ORDER — METOPROLOL SUCCINATE 100 MG/1
TABLET, EXTENDED RELEASE ORAL
Qty: 90 TABLET | Refills: 0 | Status: SHIPPED | OUTPATIENT
Start: 2018-05-08 | End: 2022-01-19

## 2018-06-13 NOTE — PROGRESS NOTES
HISTORY OF PRESENT ILLNESS:  This is a 57-year-old male, well known to me, who   presents for his annual history and physical.  He is currently without any   complaints.    PAST MEDICAL HISTORY:  History significant for hypertension and gastroesophageal   reflux disease that has improved over the last several years.    MEDICATIONS:  Toprol- mg at bedtime and Norvasc 10 mg in the morning,   aspirin 81 mg daily, clonazepam p.r.n. anxiety, Ambien 10 mg p.r.n. insomnia and   Protonix p.r.n. heartburn.    ALLERGIES:  To penicillin with some unclear reaction that occurred when the   patient was in his youth.    FAMILY HISTORY:  Significant for his father passing away of hypertension.  He   has a sister who is 56 and healthy and another sister who is 41 and healthy.    SOCIAL HISTORY:  Negative for any cigarette use.  Social alcohol use.    REVIEW OF SYSTEMS:  Negative for any exertional chest pain or unusual shortness   of breath.  No melena.  No unusual fatigue.  He has occasional blood on the   tissue when he wipes after a bowel movement.    SCREENING HISTORY:  Significant for a 3 mm polyp that was a tubular adenoma in   June 2015.  The recommendation was to repeat colonoscopy in five years, which   would be June 2020.    PHYSICAL EXAMINATION:  VITAL SIGNS:  Blood pressure was 124/86.  HEENT:  Mouth and throat were clear.  NECK:  There is no goiter.  No adenopathy.  CHEST:  Clear to auscultation bilaterally.  CARDIOVASCULAR:  Regular rate and rhythm.  No murmurs or gallops.  ABDOMEN:  Soft, nontender, positive bowel sounds.  No organomegaly.  EXTREMITIES:  There was no edema.    LABORATORY DATA:  Vitamin D level was mildly low at 19.  Hemoglobin A1c was   within normal limits at 4.2.  PSA was within normal limits at 0.36.  TSH was   within normal limits at 3.8.  The total cholesterol was 211.  The HDL or good   cholesterol was 54.  The LDL or bad cholesterol was 137.  CBC was unremarkable.    The CMP was  unremarkable.  An ECG stress test was performed and was negative   for ischemia.    ASSESSMENT:  1.  Hypertension with good control.  2.  History of gastroesophageal reflux disease, well controlled.  3.  History of occasional hemorrhoidal bleeding on tissue paper with a negative   colonoscopy in 2015.  4.  Mildly elevated LDL cholesterol level.  5.  Mildly low vitamin D level.    RECOMMENDATIONS:  At this time are to decrease the Toprol-XL to one-half of a   100 mg tablet nightly for one week and then to stop all Toprol one week after   that and monitor blood pressure.  The patient should follow a low-fat,   low-cholesterol diet.  He should add a multivitamin to his daily regimen and   continue his other current medications.      DEANA  dd: 06/12/2018 15:21:33 (CDT)  td: 06/13/2018 13:11:46 (CDT)  Doc ID   #[id number]  Job ID #[id number]    CC:

## 2019-01-23 RX ORDER — METOPROLOL SUCCINATE 100 MG/1
TABLET, EXTENDED RELEASE ORAL
Qty: 90 TABLET | Refills: 0 | Status: SHIPPED | OUTPATIENT
Start: 2019-01-23

## 2019-02-20 ENCOUNTER — HOSPITAL ENCOUNTER (OUTPATIENT)
Dept: RADIOLOGY | Facility: HOSPITAL | Age: 59
Discharge: HOME | End: 2019-02-20
Attending: ORTHOPAEDIC SURGERY
Payer: COMMERCIAL

## 2019-02-20 ENCOUNTER — TELEPHONE (OUTPATIENT)
Dept: SPORTS MEDICINE | Facility: CLINIC | Age: 59
End: 2019-02-20

## 2019-02-20 DIAGNOSIS — M25.562 PAIN IN BOTH KNEES, UNSPECIFIED CHRONICITY: Primary | ICD-10-CM

## 2019-02-20 DIAGNOSIS — M25.561 PAIN IN BOTH KNEES, UNSPECIFIED CHRONICITY: Primary | ICD-10-CM

## 2019-02-20 DIAGNOSIS — M25.561 PAIN IN BOTH KNEES, UNSPECIFIED CHRONICITY: ICD-10-CM

## 2019-02-20 DIAGNOSIS — M25.562 PAIN IN BOTH KNEES, UNSPECIFIED CHRONICITY: ICD-10-CM

## 2019-02-20 PROCEDURE — 73564 XR KNEE ORTHO BILAT WITH FLEXION: ICD-10-PCS | Mod: 26,RT,, | Performed by: RADIOLOGY

## 2019-02-20 PROCEDURE — 73721 MRI JNT OF LWR EXTRE W/O DYE: CPT | Mod: 26,LT,, | Performed by: RADIOLOGY

## 2019-02-20 PROCEDURE — 73721 MRI KNEE WITHOUT CONTRAST LEFT: ICD-10-PCS | Mod: 26,LT,, | Performed by: RADIOLOGY

## 2019-02-20 PROCEDURE — 73721 MRI JNT OF LWR EXTRE W/O DYE: CPT | Mod: TC,LT

## 2019-02-20 PROCEDURE — 73721 MRI JNT OF LWR EXTRE W/O DYE: CPT | Mod: TC,RT

## 2019-02-20 PROCEDURE — 73564 X-RAY EXAM KNEE 4 OR MORE: CPT | Mod: 26,RT,, | Performed by: RADIOLOGY

## 2019-02-20 PROCEDURE — 73721 MRI JNT OF LWR EXTRE W/O DYE: CPT | Mod: 26,RT,, | Performed by: RADIOLOGY

## 2019-02-20 PROCEDURE — 73721 MRI KNEE WITHOUT CONTRAST RIGHT: ICD-10-PCS | Mod: 26,RT,, | Performed by: RADIOLOGY

## 2019-02-20 PROCEDURE — 73564 X-RAY EXAM KNEE 4 OR MORE: CPT | Mod: 26,LT,, | Performed by: RADIOLOGY

## 2019-02-20 PROCEDURE — 73564 X-RAY EXAM KNEE 4 OR MORE: CPT | Mod: TC,50

## 2019-02-21 ENCOUNTER — OFFICE VISIT (OUTPATIENT)
Dept: SPORTS MEDICINE | Facility: CLINIC | Age: 59
End: 2019-02-21
Payer: COMMERCIAL

## 2019-02-21 DIAGNOSIS — M17.0 BILATERAL PRIMARY OSTEOARTHRITIS OF KNEE: Primary | ICD-10-CM

## 2019-02-21 PROCEDURE — 99214 PR OFFICE/OUTPT VISIT, EST, LEVL IV, 30-39 MIN: ICD-10-PCS | Mod: S$GLB,,, | Performed by: ORTHOPAEDIC SURGERY

## 2019-02-21 PROCEDURE — 99214 OFFICE O/P EST MOD 30 MIN: CPT | Mod: S$GLB,,, | Performed by: ORTHOPAEDIC SURGERY

## 2019-02-21 PROCEDURE — 99999 PR PBB SHADOW E&M-EST. PATIENT-LVL I: CPT | Mod: PBBFAC,,, | Performed by: ORTHOPAEDIC SURGERY

## 2019-02-21 PROCEDURE — 99999 PR PBB SHADOW E&M-EST. PATIENT-LVL I: ICD-10-PCS | Mod: PBBFAC,,, | Performed by: ORTHOPAEDIC SURGERY

## 2019-02-21 NOTE — PROGRESS NOTES
CC: Right > left knee pain    58 y.o. Male who presents as a new patient to me. [employer].  He presents today for repeat assessment of both knees.  Past medical history notable for chronic bilateral knee pain, right greater than left.  He has known osteoarthritis in the right knee. Prior treatment has included arthroscopic debridements x2, most recently in 2013.  Postoperative treatment included a Synvisc-One injection which did provide good relief.  He has been more active recently and has developed recurrent pain and stiffness. Most prominent in the morning.  Some degree of improvement with activity during the day but tends to bother him after a long day on his feet or with high impact activity.  He is looking to get back into some exercise and wants some guidance in that regard.  Right knee pain localizes primarily over the medial joint. Recent treatment has included oral anti-inflammatory medication which he takes daily along with simple activity modification. Uses a medial  brace occasionally. He report intermittent left knee pain as well.  Also anteromedially based.  No recent effusions in either knee, although the right knee can swell with activity.  No prominent mechanical symptoms. Mild subjective weakness. No back or radicular symptoms.    REVIEW OF SYSTEMS:   Constitution: Negative. Negative for chills, fever and night sweats.    Hematologic/Lymphatic: Negative for bleeding problem. Does not bruise/bleed easily.   Skin: Negative for dry skin, itching and rash.   Musculoskeletal: Negative for falls. Positive for right greater than left knee pain. Mild significant weakness.     All other review of symptoms were reviewed and found to be noncontributory.     PAST MEDICAL HISTORY:   Past Medical History:   Diagnosis Date    Hypertension        PAST SURGICAL HISTORY:   Past Surgical History:   Procedure Laterality Date    ARTHROSCOPY, KNEE, WITH DEBRIDEMENT Right 6/20/2013    Performed by Ayaka  MD Mariann at [facility], [place]    CHONDRECTOMY, KNEE, SEMILUNAR CARTILAGE Right 6/20/2013    Performed by Ayaka Waite MD at [facility] [place]    CHONDROPLASTY-KNEE  6/20/2013    Performed by Ayaka Waite MD at [facility] [place]    COLONOSCOPY  06/2016    COLONOSCOPY N/A 6/19/2015    Performed by Davis Meraz [facility] at [facility] END[facility] (4TH FLR)    KNEE SURGERY      bilateral    REFRACTIVE SURGERY      TONSILLECTOMY         FAMILY HISTORY:   Family History   Problem Relation Age of Onset    Glaucoma Mother     Anxiety disorder Neg Hx        SOCIAL HISTORY:   Social History     Socioeconomic History    Marital status:      Spouse name: Not on file    Number of children: 2    Years of education: Not on file    Highest education level: Not on file   Social Needs    Financial resource strain: Not on file    Food insecurity - worry: Not on file    Food insecurity - inability: Not on file    Transportation needs - medical: Not on file    Transportation needs - non-medical: Not on file   Occupational History    Not on file   Tobacco Use    Smoking status: Never Smoker    Smokeless tobacco: Never Used   Substance and Sexual Activity    Alcohol use: Yes     Alcohol/week: 3.6 oz     Types: 6 Glasses of wine per week     Comment: every couple of weeks    Drug use: No    Sexual activity: Yes   Other Topics Concern    Patient feels they ought to cut down on drinking/drug use No    Patient annoyed by others criticizing their drinking/drug use No    Patient has felt bad or guilty about drinking/drug use No    Patient has had a drink/used drugs as an eye opener in the AM No   Social History Narrative    Not on file       MEDICATIONS:     Current Outpatient Medications:     amlodipine (NORVASC) 10 MG tablet, Take 10 mg by mouth once daily., Disp: , Rfl:     aspirin (ECOTRIN) 81 MG EC tablet, Take 81 mg by mouth once daily., Disp: , Rfl:     clonazePAM (KLONOPIN) 0.5 MG tablet, Take 1 tablet (0.5 mg total) by mouth 3 (three) times  daily., Disp: 90 tablet, Rfl: 0    escitalopram oxalate (LEXAPRO) 10 MG tablet, Take 1 tablet (10 mg total) by mouth once daily., Disp: 90 tablet, Rfl: 0    metoprolol succinate (TOPROL-XL) 100 MG 24 hr tablet, TAKE 1 TABLET(100 MG) BY MOUTH EVERY EVENING, Disp: 90 tablet, Rfl: 0    metoprolol succinate (TOPROL-XL) 100 MG 24 hr tablet, TAKE 1 TABLET(100 MG) BY MOUTH EVERY EVENING, Disp: 90 tablet, Rfl: 0    multivitamin capsule, Take 1 capsule by mouth once daily., Disp: , Rfl:     omeprazole (PRILOSEC) 20 MG capsule, Take 1 capsule (20 mg total) by mouth once daily. As needed, Disp: 60 capsule, Rfl: 0    ALLERGIES:   Review of patient's allergies indicates:   Allergen Reactions    Penicillins         PHYSICAL EXAMINATION:  There were no vitals taken for this visit.  General: Well-developed well-nourished 58 y.o. malein no acute distress   Cardiovascular: Regular rhythm by palpation of distal pulse, normal color and temperature, no concerning varicosities on symptomatic side   Lungs: No labored breathing or wheezing appreciated   Neuro: Alert and oriented ×3   Psychiatric: well oriented to person, place and time, demonstrates normal mood and affect   Skin: No rashes, lesions or ulcers, normal temperature, turgor, and texture on involved extremity    Ortho/SPM Exam  Examination of the right knee demonstrates previously healed scope incisions.  No effusion. Standing varus alignment. Positive crepitus with range of motion. Central patellar tracking. No patellar instability. Mild tenderness over the medial joint line.  Negative Melody's test.  Pain medially with varus load.  Ligamentously stable. Excellent quadriceps bulk and tone. No PVD.  2+ dorsalis pedis pulse.      Exam of the left knee demonstrates no swelling or effusion. Central patellar tracking. Negative patellar apprehension.  Minimal medial joint line tenderness to palpation.  Negative Melody's test.  Excellent quadriceps bulk and tone.   Ligamentously stable.      IMAGING:  X-rays including standing, weight bearing AP and flexion bilateral knees, RIGHT and LEFT knee lateral and sunrise views ordered and images reviewed by me show:  Posteromedial advanced disease in the right knee with bone-on-bone articulation in mid flexion.  Tricompartmental degenerative changes in the right knee.  Less prominent degenerative changes in the left knee. Well-maintained joint spaces on that side.     MRI R Knee reviewed by me and discussed with patient. Study shows:    1. Tricompartmental osteoarthritis with extensive full-thickness cartilage loss within the medial tibiofemoral compartment.   2. Postoperative changes of partial medial meniscectomy.  Intact lateral meniscus.   3. Irregularity of the middle 3rd of the ACL concerning for at least partial tear.   4. Moderate joint effusion.  Large complex popliteal cyst.    MRI L Knee reviewed by me and discussed with patient. Study shows:    1. The trochlear cartilage demonstrates a small area of the focal severe chondromalacia with tiny subchondral cystic changes at the medial trochlea.   2. The lateral knee compartment cartilage is intact.   3. There is a focal area of high-grade chondromalacia involving the posterior medial femoral condyle, an area measuring about 12 x 4 mm with minimal subchondral edema.  The medial tibial plateau cartilage is intact.    ASSESSMENT:      ICD-10-CM ICD-9-CM   1. Bilateral primary osteoarthritis of knee M17.0 715.16     PLAN:     I had a long discussion with the patient today regarding the status of both knees.  Also discussed both operative and non operative treatment options for knee arthritis. Recommendation is for continue conservative care at this time.  Medrol Dosepak as an option prior to periods of increased activity.  Also discussed the safe use of oral anti-inflammatory medication.  Can consider repeat viscosupplementation in the future.  Transition to low-impact cardio  exercise activities and focus on continue quadriceps strengthening.  Leukocyte poor PRP is an option in the future as well. The patient may follow up with me as needed.    Procedures

## 2019-02-22 ENCOUNTER — CLINICAL SUPPORT (OUTPATIENT)
Dept: INTERNAL MEDICINE | Facility: CLINIC | Age: 59
End: 2019-02-22

## 2019-02-22 DIAGNOSIS — R53.83 FATIGUE, UNSPECIFIED TYPE: Primary | ICD-10-CM

## 2019-02-22 LAB — TESTOST SERPL-MCNC: 567 NG/DL

## 2019-02-22 PROCEDURE — 84403 ASSAY OF TOTAL TESTOSTERONE: CPT

## 2019-02-28 ENCOUNTER — PATIENT MESSAGE (OUTPATIENT)
Dept: GASTROENTEROLOGY | Facility: CLINIC | Age: 59
End: 2019-02-28

## 2019-04-24 ENCOUNTER — TELEPHONE (OUTPATIENT)
Dept: SPORTS MEDICINE | Facility: CLINIC | Age: 59
End: 2019-04-24

## 2019-04-24 DIAGNOSIS — M17.0 BILATERAL PRIMARY OSTEOARTHRITIS OF KNEE: Primary | ICD-10-CM

## 2019-04-24 NOTE — TELEPHONE ENCOUNTER
Steve is complaining of continued bilateral knee pain. He has a known history of osteoarthritis.  He is requesting physical therapy.  I believe this is a good idea.  We will set him up to have this done at [facility].

## 2019-04-26 ENCOUNTER — CLINICAL SUPPORT (OUTPATIENT)
Dept: REHABILITATION | Facility: HOSPITAL | Age: 59
End: 2019-04-26
Attending: ORTHOPAEDIC SURGERY
Payer: COMMERCIAL

## 2019-04-26 DIAGNOSIS — G89.29 BILATERAL CHRONIC KNEE PAIN: ICD-10-CM

## 2019-04-26 DIAGNOSIS — M25.562 BILATERAL CHRONIC KNEE PAIN: ICD-10-CM

## 2019-04-26 DIAGNOSIS — M25.561 BILATERAL CHRONIC KNEE PAIN: ICD-10-CM

## 2019-04-26 PROCEDURE — 97110 THERAPEUTIC EXERCISES: CPT

## 2019-04-26 PROCEDURE — 97161 PT EVAL LOW COMPLEX 20 MIN: CPT

## 2019-04-29 NOTE — PLAN OF CARE
[facility]  Physical Therapy Initial Evaluation    Name: Steve Carranza  Clinic Number: [id number]    Therapy Diagnosis:   Encounter Diagnosis   Name Primary?    Bilateral chronic knee pain      Physician: RHONA Christianson MD    Physician Orders: PT Eval and Treat   Medical Diagnosis: M17.0 (ICD-10-CM) - Bilateral primary osteoarthritis of knee  Evaluation Date: 4/26/2019  Authorization Period Expiration: 12/31/2019  Plan of Care Certification Period: 9/13/2019  Visit # / Visits authorized: 1/ 20  Date of Surgery: NA  Precautions: Standard    Time In: 1100a  Time Out: 1200p  Total Billable Time: 60 minutes    Subjective     Date of onset: Insidious  History of current condition - Steve reports: that he has history of knee pain that got a lot better with exercise, but he has recently gained weight and stopped exercising.  He states that he feels right greater than left knee pain with increased activity.  He states that he is feeling like his pain is getting worse recently.  He states discomfort with stairs, squatting, prolonged walking, sit to stand.     Past Medical History:   Diagnosis Date    Hypertension      Steve Carranza  has a past surgical history that includes Knee surgery; Tonsillectomy; Refractive surgery; and Colonoscopy (06/2016).    Steve RIVERA has a current medication list which includes the following prescription(s): amlodipine, aspirin, clonazepam, escitalopram oxalate, metoprolol succinate, metoprolol succinate, multivitamin, and omeprazole.    Review of patient's allergies indicates:   Allergen Reactions    Penicillins         Prior Therapy: No  Social History: Lives at home with family  Occupation: [employer]  Prior Level of Function: No limitations  Current Level of Function: Limited walking, stairs, squatting    Pain:  Current 0/10, worst 7/10, best 0/10   Location: bilateral knee   Description: Aching, Throbbing and Deep    Pts goals: To lose weight, walk  "more without pain, exercise without pain    Objective     Functional tests:    SL Squat: R>L valgus and pain     Range of Motion:   Knee Right (AROM/PROM) Left (AROM/PROM)   Flexion 141 degrees 144 degrees   Extension 3 degrees 5 degrees     Lower Extremity Strength:  Right LE  Left LE    Quadriceps: 5/5 Quadriceps: 5/5   Hamstrings: 5/5 Hamstrings: 5/5   Hip flexion (supine): 5/5 Hip flexion (supine): 5/5   Hip extension:  4/5 Hip extension: 4+/5   Glute Med: 3+/5 Glute Med:  4/5       TREATMENT     Treatment Time In: 1125a  Treatment Time Out: 1205p  Total Treatment time separate from Evaluation time:40 minutes    Steve received therapeutic exercises to develop strength, endurance, core strength for 40 minutes including:  Education in diagnosis and plan of care  SLR - 2 x 10  SL bridge - 2 x 10  Side plank with hip abd - 2 x 10  HS stretch - 2 x 30"  3 way kick - 2 x 8  Monster walk backward - 20x, isaias    Steve received the following manual therapy techniques for 0 minutes including:    Home Exercises and Patient Education Provided    Education provided re: therapeutic diagnosis and plan of care    Written Home Exercises Provided: Yes  Exercises were reviewed and Steve was able to demonstrate them prior to the end of the session.   Pt received a written copy of exercises to perform at home. Steve demonstrated good  understanding of the education provided.     See EMR under patient instructions for exercises given.     Assessment     Steve RIVERA is a 58 y.o. male referred to outpatient Physical Therapy with a medical diagnosis of bilateral knee osteoarthritis. Pt presents with objective deficits in right greater than left quadriceps force production, bilateral hip strength, bilateral hip flexor flexibility that limits functional ability to walk, squat, stairs.    Pt prognosis is Excellent.   Pt will benefit from skilled outpatient Physical Therapy to address the deficits stated above and in the chart below, provide " pt/family education, and to maximize pt's level of independence.     Plan of care discussed with patient: Yes  Pt's spiritual, cultural and educational needs considered and patient is agreeable to the plan of care and goals as stated below:     Anticipated Barriers for therapy: None    Medical Necessity is demonstrated by the following  History  Co-morbidities and personal factors that may impact the plan of care Co-morbidities:   See chart    Personal Factors:   no deficits     low   Examination  Body Structures and Functions, activity limitations and participation restrictions that may impact the plan of care Body Regions:   lower extremities    Body Systems:    ROM  strength    Participation Restrictions:   None    Activity limitations:   Learning and applying knowledge  no deficits    General Tasks and Commands  no deficits    Communication  no deficits    Mobility  lifting and carrying objects    Self care  no deficits    Domestic Life  no deficits    Interactions/Relationships  no deficits    Life Areas  no deficits    Community and Social Life  no deficits         low   Clinical Presentation stable and uncomplicated low   Decision Making/ Complexity Score: low       Goals:  Short Term Goals: 4 weeks   1. The patient will demonstrate no pain with ascending 20 steps reciprocally.  2. The patient will demonstrate no pain with squatting 15 times.  3. The patient will demonstrate 4/5 strength of affected gluteus medius bilaterally to increase ease with squatting and stairs.    Long Term Goals: 20 weeks   1. The patient will demonstrate ability to walk 30 minutes with no discomfort.  3. The patient will demonstrate 4+/5 strength of affected gluteus medius to increase ease with squatting and stairs.    Plan   Certification Period/Plan of care expiration: 4/26/2019 to 9/13/2019.    Outpatient Physical Therapy 2 times weekly for 20 weeks to include the following interventions: manual therapy as needed, therapeutic  exercises to address functional deficits, modalities prn, wound care prn, dry needling prn, treatment by a skilled PTA at times.    Alex Govea, PT

## 2019-08-14 RX ORDER — CLONAZEPAM 0.5 MG/1
0.5 TABLET ORAL 3 TIMES DAILY
Qty: 90 TABLET | Refills: 0 | OUTPATIENT
Start: 2019-08-14

## 2019-08-19 RX ORDER — CLONAZEPAM 0.5 MG/1
0.5 TABLET ORAL 3 TIMES DAILY
Qty: 90 TABLET | Refills: 0 | Status: SHIPPED | OUTPATIENT
Start: 2019-08-19 | End: 2020-04-24 | Stop reason: SDUPTHER

## 2020-02-05 ENCOUNTER — TELEPHONE (OUTPATIENT)
Dept: SPORTS MEDICINE | Facility: CLINIC | Age: 60
End: 2020-02-05

## 2020-02-05 NOTE — TELEPHONE ENCOUNTER
I saw this patient at his house 2 days ago.  Complaint is a 2 to three-week history of intermittent musculogenic low back pain. Symptoms worse with extremes of motion, torque of the low back such as during a golf swing, heavy lifting.  Patient associated onset of symptoms with recent increased exercise activity.  Tightness and dull achy pain sometimes radiates into both posterior thighs and the buttocks.  No neurogenic complaints otherwise.  No radicular symptoms radiating distal to the knee. No bowel or bladder complaints.  No saddle anesthesia. He localizes his pain over the paraspinal musculature of the lumbar spine. He denies any significant prior low back problems.  Pain was getting better but he played 18 holes of golf this past weekend and has some recurrence of symptoms.  Treatment has included rest, ice, and oral anti-inflammatory medication.    No significant past medical or surgical history.    On exam the patient has mildly restricted lumbar range of motion. He is able to touch his toes.  Mild tenderness to palpation over the lumbar paraspinal musculature on both sides.  No significant midline tenderness. No tenderness over the SI joints. No pain with lateral bend or extension of the lumbar spine. Negative straight leg raise.  Full motor and sensory function intact to the bilateral lower extremities.    Assessment:  Lumbar strain    Plan:  No red flag symptoms by history or on exam.  No imaging needed at this time.  Plan to treat conservatively with a Medrol Dosepak, Parafon Forte for muscle relaxation, alternating heat and ice as needed, dry needling with the athletic training staff. GI precautions discussed.  Restart oral anti-inflammatory medication once he has finished his Medrol Dosepak.  Instructed on red flag symptoms and he will contact me should he have any issues.  If pain is persistent or recurrent, next step would be imaging to include an MRI.

## 2020-04-14 ENCOUNTER — HOSPITAL ENCOUNTER (OUTPATIENT)
Dept: RADIOLOGY | Facility: HOSPITAL | Age: 60
Discharge: HOME | End: 2020-04-14
Attending: ORTHOPAEDIC SURGERY
Payer: COMMERCIAL

## 2020-04-14 ENCOUNTER — OFFICE VISIT (OUTPATIENT)
Dept: SPORTS MEDICINE | Facility: CLINIC | Age: 60
End: 2020-04-14
Payer: COMMERCIAL

## 2020-04-14 ENCOUNTER — TELEPHONE (OUTPATIENT)
Dept: SPORTS MEDICINE | Facility: CLINIC | Age: 60
End: 2020-04-14

## 2020-04-14 ENCOUNTER — LAB VISIT (OUTPATIENT)
Dept: LAB | Facility: HOSPITAL | Age: 60
End: 2020-04-14
Attending: ORTHOPAEDIC SURGERY
Payer: COMMERCIAL

## 2020-04-14 DIAGNOSIS — R10.9 LEFT FLANK PAIN: Primary | ICD-10-CM

## 2020-04-14 DIAGNOSIS — R10.9 LEFT FLANK PAIN: ICD-10-CM

## 2020-04-14 DIAGNOSIS — G89.29 CHRONIC LEFT-SIDED LOW BACK PAIN WITHOUT SCIATICA: Primary | ICD-10-CM

## 2020-04-14 DIAGNOSIS — M54.50 CHRONIC LEFT-SIDED LOW BACK PAIN WITHOUT SCIATICA: Primary | ICD-10-CM

## 2020-04-14 LAB
MICROSCOPIC COMMENT: NORMAL
RBC #/AREA URNS AUTO: 0 /HPF (ref 0–4)
WBC #/AREA URNS AUTO: 0 /HPF (ref 0–5)

## 2020-04-14 PROCEDURE — 99212 PR OFFICE/OUTPT VISIT, EST, LEVL II, 10-19 MIN: ICD-10-PCS | Mod: S$GLB,,, | Performed by: ORTHOPAEDIC SURGERY

## 2020-04-14 PROCEDURE — 81001 URINALYSIS AUTO W/SCOPE: CPT

## 2020-04-14 PROCEDURE — 99212 OFFICE O/P EST SF 10 MIN: CPT | Mod: S$GLB,,, | Performed by: ORTHOPAEDIC SURGERY

## 2020-04-14 PROCEDURE — 99999 PR PBB SHADOW E&M-EST. PATIENT-LVL II: ICD-10-PCS | Mod: PBBFAC,,, | Performed by: ORTHOPAEDIC SURGERY

## 2020-04-14 PROCEDURE — 72110 XR LUMBAR SPINE COMPLETE 5 VIEW: ICD-10-PCS | Mod: 26,,, | Performed by: RADIOLOGY

## 2020-04-14 PROCEDURE — 72110 X-RAY EXAM L-2 SPINE 4/>VWS: CPT | Mod: TC

## 2020-04-14 PROCEDURE — 72110 X-RAY EXAM L-2 SPINE 4/>VWS: CPT | Mod: 26,,, | Performed by: RADIOLOGY

## 2020-04-14 PROCEDURE — 99999 PR PBB SHADOW E&M-EST. PATIENT-LVL II: CPT | Mod: PBBFAC,,, | Performed by: ORTHOPAEDIC SURGERY

## 2020-04-14 NOTE — PROGRESS NOTES
CC: Low back pain     59 y.o. Male is a patient known to me.  I saw him several months ago for a low back tightness and intermittent pain complaint.  Findings at that time most consistent with a lumbar strain. The patient has been treated conservatively with a Medrol Dosepak, oral steroids, a muscle relaxant, activity modifications and physical therapy.  Unfortunately he continues to have problems without significant improvement.  He reports a constant low grade 2-3/10 discomfort.  Again localizes to the midline lumbar spine but predominantly over the left paraspinal area with radiation somewhat along a dermatomal distribution over the posterior left flank.  Denies significant right sided issues.  More recently he has had some numbness and tingling into the toes and feet with biking.  He has also had some intermittent prior left the posterior buttock pain. Pain is often worse with forward bending.  He has remained quite active with regards to golf, exercise activity, lifting.  None which time for rest.  He does report some decreased urine voids but no pain with urination.  No blood in urine.  No history of prior renal stones.  He does have a history of prior shingles.  This was accompanied by a rash.  This was years ago.  He did have a viral illness about 2 months ago.  No current skin sensitivity to touch.  No history of specific injury mechanism.    No fevers chills or night sweats.  No subjective illness.  States he feels great otherwise.    PAST MEDICAL HISTORY:   Past Medical History:   Diagnosis Date    Hypertension      PAST SURGICAL HISTORY:  Past Surgical History:   Procedure Laterality Date    COLONOSCOPY  06/2016    KNEE SURGERY      bilateral    REFRACTIVE SURGERY      TONSILLECTOMY       FAMILY HISTORY:  Family History   Problem Relation Age of Onset    Glaucoma Mother     Anxiety disorder Neg Hx      MEDICATIONS:    Current Outpatient Medications:     amlodipine (NORVASC) 10 MG tablet, Take 10  mg by mouth once daily., Disp: , Rfl:     aspirin (ECOTRIN) 81 MG EC tablet, Take 81 mg by mouth once daily., Disp: , Rfl:     clonazePAM (KLONOPIN) 0.5 MG tablet, Take 1 tablet (0.5 mg total) by mouth 3 (three) times daily., Disp: 90 tablet, Rfl: 0    escitalopram oxalate (LEXAPRO) 10 MG tablet, Take 1 tablet (10 mg total) by mouth once daily., Disp: 90 tablet, Rfl: 0    metoprolol succinate (TOPROL-XL) 100 MG 24 hr tablet, TAKE 1 TABLET(100 MG) BY MOUTH EVERY EVENING, Disp: 90 tablet, Rfl: 0    metoprolol succinate (TOPROL-XL) 100 MG 24 hr tablet, TAKE 1 TABLET(100 MG) BY MOUTH EVERY EVENING, Disp: 90 tablet, Rfl: 0    multivitamin capsule, Take 1 capsule by mouth once daily., Disp: , Rfl:     omeprazole (PRILOSEC) 20 MG capsule, Take 1 capsule (20 mg total) by mouth once daily. As needed, Disp: 60 capsule, Rfl: 0    ALLERGIES:  Review of patient's allergies indicates:   Allergen Reactions    Penicillins      REVIEW OF SYSTEMS:  Constitution: Negative. Negative for chills, fever and night sweats.    Hematologic/Lymphatic: Negative for bleeding problem. Does not bruise/bleed easily.   Skin: Negative for dry skin, itching and rash.   Musculoskeletal: Negative for falls. Positive for low back pain and tightness    PHYSICAL EXAMINATION:  Vitals:  There were no vitals taken for this visit.   General: Well-developed well-nourished 59 y.o. malein no acute distress   Cardiovascular: Regular rhythm by palpation of distal pulse, normal color and temperature, no concerning varicosities on symptomatic side   Lungs: No labored breathing or wheezing appreciated   Neuro: Alert and oriented ×3   Psychiatric: well oriented to person, place and time, demonstrates normal mood and affect   Skin: No rashes, lesions or ulcers, normal temperature, turgor, and texture on uninvolved extremity    Ortho/SPM Exam  Repeat exam of the lumbar spine demonstrates an area of tenderness over the left paraspinal musculature which the patient  localizes to emanate from the lumbar spine midline.  Discomfort with forward bending.  No pain with lumbar extension or lateral bending.  Negative straight leg raise bilaterally.  Full motor and sensory function intact of the bilateral lower extremity.  No pathologic reflexes.  Area of tenderness extends laterally along the left flank.  Minimal tenderness over the posterior SI joint bilaterally.  Full passive hip range of motion.  No pain on resisted testing.  No skin changes.  No areas of acute inflammation.     IMAGING:  X-rays of lumbar spine to include five views demonstrates multilevel mild lumbar spondylosis involving the facets.  Otherwise well-maintained disc spaces mild marginal sclerosis and osteophyte formation.  No acute findings.  Well-maintained alignment.    ASSESSMENT:      ICD-10-CM ICD-9-CM   1. Chronic left-sided low back pain without sciatica M54.5 724.2    G89.29 338.29   2. Left flank pain R10.9 789.09     PLAN:     The patient has now had some low back pain for several months.  More recent radiating symptoms into the feet which may be related.  Predominant left-sided low back complaint extending into the left posterior flank.  Advanced imaging at this time is warranted given his limited response to conservative treatment.  MRI of the lumbar spine was ordered.  Also will check a micro urinalysis is a screening test for possible renal stone which I think is unlikely in this case.  Otherwise of advised him to scale back his lifting regimen.  Continue oral anti-inflammatory medication and Parafon until further information is available.  All questions answered.      Procedures

## 2020-04-24 RX ORDER — CLONAZEPAM 0.5 MG/1
0.5 TABLET ORAL 3 TIMES DAILY
Qty: 90 TABLET | Refills: 0 | Status: SHIPPED | OUTPATIENT
Start: 2020-04-24 | End: 2020-06-12 | Stop reason: SDUPTHER

## 2020-04-27 DIAGNOSIS — Z00.00 ROUTINE GENERAL MEDICAL EXAMINATION AT A HEALTH CARE FACILITY: Primary | ICD-10-CM

## 2020-04-27 DIAGNOSIS — Z00.00 PREVENTATIVE HEALTH CARE: Primary | ICD-10-CM

## 2020-04-29 ENCOUNTER — OFFICE VISIT (OUTPATIENT)
Dept: PSYCHIATRY | Facility: CLINIC | Age: 60
End: 2020-04-29
Payer: COMMERCIAL

## 2020-04-29 DIAGNOSIS — F41.1 GAD (GENERALIZED ANXIETY DISORDER): Primary | ICD-10-CM

## 2020-04-29 PROCEDURE — 99213 OFFICE O/P EST LOW 20 MIN: CPT | Mod: 95,,, | Performed by: PSYCHIATRY & NEUROLOGY

## 2020-04-29 PROCEDURE — 99213 PR OFFICE/OUTPT VISIT, EST, LEVL III, 20-29 MIN: ICD-10-PCS | Mod: 95,,, | Performed by: PSYCHIATRY & NEUROLOGY

## 2020-04-29 NOTE — PROGRESS NOTES
Outpatient Psychiatry Follow-Up Visit (MD/NP)    2020    Clinical Status of Patient:  Outpatient (Ambulatory)    Chief Complaint:  Steve Carranza is a 59 y.o. male who presents today for follow-up of anxiety.  Met with patient.  SR MCCLURE of [facility] [place] [facility]       The patient location is: in office in [place]     The chief complaint leading to consultation is: anxiety  Visit type: audiovisual  Total time spent with patient: 30 mins   Each patient to whom he or she provides medical services by telemedicine is:  (1) informed of the relationship between the physician and patient and the respective role of any other health care provider with respect to management of the patient; and (2) notified that he or she may decline to receive medical services by telemedicine and may withdraw from such care at any time.    Notes: see below     Interval History and Content of Current Session:  Interim Events/Subjective Report/Content of Current Session:     Pt seen initially in 2017 .   History of Present Illness: Anxiety  Patient is here for follow up  of anxiety.  He presented initially in 2017 with  the following anxiety symptoms: chest pain, difficulty concentrating, dizziness, fatigue, insomnia, irritable, palpitations, paresthesias, psychomotor agitation, racing thoughts, sweating. Onset of symptoms was approximately in .  Symptoms have been improving  since  2017 . He denies current suicidal and homicidal ideation. Family history significant for no psychiatric illness.Possible organic causes contributing are: none. Risk factors: none Previous treatment includes medication Xanax.   He complains of the following medication side effects: none.     GEO ; does worry excessively about his kids ; health and mortality muscle tension , irritably ; fatigue - these have improved      Dad  of alc liver dz at 50; mom at 82      2 days prior to our initial meeting in 2107 , he  had am mtg here then [facility] with  nausea and tension ;  / 100 ; responded to 0.25 mg xanax then again yesterday and did well.         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 [facility]  Sees Dr Rodríguez  - mild ADD dx        2002 to  to 6yr younger wife , Shanae, who is an expert physicval [employer]  out of [place] and other various locations       First wife , her 2nd .  2001. Lots of time with kids . No time alone with each other .     House in [place]      Klonopin prn event or insomnia; twice monthly       Psychiatric Review Of Systems - Is patient experiencing or having changes in:  sleep: good   Appetite : good   weight: no , stable   energy/anergy: no  interest/pleasure/anhedonia:  Good   somatic symptoms: no  libido: no  anxiety/panic: no panic , anxiety controlled with clonazepam   guilty/hopelessness: feels well   concentration: not sure , some difficulty with casual reading   S.I.B.s/risky behavior: no  Irritability: yes but on small scale   Racing thoughts: yes , worried thoughts   Impulsive behaviors: no  Paranoia: no  AVH: no      1/2 cup of coffee q day ; lots of water     Has recovered from Covid contracted on hunting trip on plane , not tested  ; AB +.     from [place] around the corner ; often see each other , talk daily and see daily . Possible reconciliation.      Psychotherapy:  · Target symptoms: anxiety , work stress  · Why chosen therapy is appropriate versus another modality: relevant to diagnosis, patient responds to this modality, evidence based practice  · Outcome monitoring methods: self-report, observation  · Therapeutic intervention type: supportive psychotherapy  · Topics discussed/themes: work stress, stress related to medical comorbidities, symptom recognition  · The patient's response to the intervention is accepting. The patient's progress  toward treatment goals is excellent.   · Duration of intervention: 11 minutes.    Review of Systems   · Prob Pert. 1 sys, Ext. psych +2 add., Comp. 10-14 sys  · PSYCHIATRIC: Pertinant items are noted in the narrative.  · CONSTITUTIONAL: No weight gain or loss.   · MUSCULOSKELETAL some  pain with new exercize   · NEUROLOGIC: No weakness, sensory changes, seizures, confusion, memory loss, tremor or other abnormal movements.  · ENDOCRINE: No polydipsia or polyuria.  · INTEGUMENTARY: No rashes or lacerations.  · EYES: No exophthalmos, jaundice or blindness.  · ENT: No dizziness, tinnitus or hearing loss.  · RESPIRATORY: No shortness of breath.  · CARDIOVASCULAR: No tachycardia or chest pain.  · GASTROINTESTINAL: No nausea, vomiting, pain, constipation or diarrhea.  · GENITOURINARY: No frequency, dysuria or sexual dysfunction.  · HEMATOLOGIC/LYMPHATIC: No excessive bleeding, prolonged or excessive bleeding after dental extraction/injury.  · ALLERGIC/IMMUNOLOGIC: No allergic response to materials, foods or animals at this time.    Past Medical, Family and Social History: The patient's past medical, family and social history have been reviewed and updated as appropriate within the electronic medical record - see encounter notes.      Dtr 18 well; kalen Mueller 13yo [facility]    Compliance: yes    Side effects: None    Risk Parameters:  Patient reports no suicidal ideation  Patient reports no homicidal ideation  Patient reports no self-injurious behavior  Patient reports no violent behavior    Exam (detailed: at least 9 elements; comprehensive: all 15 elements)   Constitutional  Vitals:  Most recent vital signs, dated less than 90 days prior to this appointment, were reviewed.   There were no vitals filed for this visit.     General:  unremarkable, age appropriate, normal weight, casually dressed, neatly groomed     Musculoskeletal  Muscle Strength/Tone:  no spasicity, no rigidity, no flaccidity   Gait &  Station:  non-ataxic     Psychiatric  Speech:  no latency; no press   Mood & Affect:  euthymic  congruent and appropriate   Thought Process:  normal and logical   Associations:  intact   Thought Content:  normal, no suicidality, no homicidality, delusions, or paranoia   Insight:  has awareness of illness   Judgement: behavior is adequate to circumstances   Orientation:  grossly intact   Memory: intact for content of interview   Language: grossly intact   Attention Span & Concentration:  able to focus   Fund of Knowledge:  intact and appropriate to age and level of education     Assessment and Diagnosis   Status/Progress: Based on the examination today, the patient's problem(s) is/are improved.  New problems have not been presented today.   Co-morbidities are not complicating management of the primary condition.  There are no active rule-out diagnoses for this patient at this time.     General Impression: improved       ICD-10-CM ICD-9-CM   1. GEO (generalized anxiety disorder) F41.1 300.02       Intervention/Counseling/Treatment Plan   · Medication Management: Continue current medications. The risks and benefits of medication were discussed with the patient.  · Counseling provided with patient as follows: importance of compliance with chosen treatment options was emphasized, risks and benefits of treatment options, including medications, were discussed with the patient     Referred to Dr Kahn in past though he did not follow through       Return to Clinic: 6 months

## 2020-06-12 RX ORDER — CLONAZEPAM 0.5 MG/1
0.5 TABLET ORAL 3 TIMES DAILY
Qty: 90 TABLET | Refills: 0 | Status: SHIPPED | OUTPATIENT
Start: 2020-06-12 | End: 2020-09-16 | Stop reason: SDUPTHER

## 2020-09-02 DIAGNOSIS — Z12.11 SCREEN FOR COLON CANCER: Primary | ICD-10-CM

## 2020-09-02 DIAGNOSIS — Z00.00 ROUTINE GENERAL MEDICAL EXAMINATION AT A HEALTH CARE FACILITY: ICD-10-CM

## 2020-09-16 RX ORDER — CLONAZEPAM 0.5 MG/1
0.5 TABLET ORAL 3 TIMES DAILY
Qty: 90 TABLET | Refills: 1 | Status: SHIPPED | OUTPATIENT
Start: 2020-09-16 | End: 2021-08-02 | Stop reason: SDUPTHER

## 2020-09-28 ENCOUNTER — TELEPHONE (OUTPATIENT)
Dept: SPORTS MEDICINE | Facility: CLINIC | Age: 60
End: 2020-09-28

## 2020-09-28 ENCOUNTER — HOSPITAL ENCOUNTER (OUTPATIENT)
Dept: RADIOLOGY | Facility: HOSPITAL | Age: 60
Discharge: HOME | End: 2020-09-28
Attending: ORTHOPAEDIC SURGERY
Payer: COMMERCIAL

## 2020-09-28 DIAGNOSIS — R10.9 LEFT FLANK PAIN: ICD-10-CM

## 2020-09-28 DIAGNOSIS — M54.16 LUMBAR RADICULOPATHY, RIGHT: Primary | ICD-10-CM

## 2020-09-28 PROCEDURE — 72148 MRI LUMBAR SPINE W/O DYE: CPT | Mod: TC

## 2020-09-28 PROCEDURE — 72148 MRI LUMBAR SPINE W/O DYE: CPT | Mod: 26,,, | Performed by: RADIOLOGY

## 2020-09-28 PROCEDURE — 72148 MRI LUMBAR SPINE WITHOUT CONTRAST: ICD-10-PCS | Mod: 26,,, | Performed by: RADIOLOGY

## 2020-09-28 NOTE — TELEPHONE ENCOUNTER
I talked to Steve this morning.  He reports ongoing intermittent low back pain with now right lower extremity radicular symptoms with self-described sciatica.  I have seen him previously dating back to last spring for his back. Plan at that time was for MRI but he elected to hold off.  Current pain is daily with radiation into the right buttock and posterior thigh region.  Given the extent and duration of his complaints despite prior conservative treatment, next step again would be advanced imaging.  Plan to proceed today with MRI of the lumbar spine.  Will follow up with him via telephone thereafter to discuss results and next steps.

## 2020-09-29 ENCOUNTER — TELEPHONE (OUTPATIENT)
Dept: SPORTS MEDICINE | Facility: CLINIC | Age: 60
End: 2020-09-29

## 2020-09-29 ENCOUNTER — CLINICAL SUPPORT (OUTPATIENT)
Dept: INTERNAL MEDICINE | Facility: CLINIC | Age: 60
End: 2020-09-29

## 2020-09-29 ENCOUNTER — HOSPITAL ENCOUNTER (OUTPATIENT)
Dept: INTERVENTIONAL RADIOLOGY/VASCULAR | Facility: HOSPITAL | Age: 60
Discharge: HOME | End: 2020-09-29
Attending: ORTHOPAEDIC SURGERY
Payer: COMMERCIAL

## 2020-09-29 VITALS
DIASTOLIC BLOOD PRESSURE: 95 MMHG | SYSTOLIC BLOOD PRESSURE: 172 MMHG | OXYGEN SATURATION: 99 % | HEART RATE: 90 BPM | RESPIRATION RATE: 14 BRPM

## 2020-09-29 DIAGNOSIS — M54.50 LOW BACK PAIN, UNSPECIFIED BACK PAIN LATERALITY, UNSPECIFIED CHRONICITY, UNSPECIFIED WHETHER SCIATICA PRESENT: Primary | ICD-10-CM

## 2020-09-29 DIAGNOSIS — Z12.11 SCREEN FOR COLON CANCER: Primary | ICD-10-CM

## 2020-09-29 DIAGNOSIS — M54.50 LOW BACK PAIN, UNSPECIFIED BACK PAIN LATERALITY, UNSPECIFIED CHRONICITY, UNSPECIFIED WHETHER SCIATICA PRESENT: ICD-10-CM

## 2020-09-29 DIAGNOSIS — Z00.00 ROUTINE GENERAL MEDICAL EXAMINATION AT A HEALTH CARE FACILITY: Primary | ICD-10-CM

## 2020-09-29 LAB
25(OH)D3+25(OH)D2 SERPL-MCNC: 28 NG/ML (ref 30–96)
ALBUMIN SERPL BCP-MCNC: 4.8 G/DL (ref 3.5–5.2)
ALP SERPL-CCNC: 60 U/L (ref 55–135)
ALT SERPL W/O P-5'-P-CCNC: 36 U/L (ref 10–44)
ANION GAP SERPL CALC-SCNC: 10 MMOL/L (ref 8–16)
AST SERPL-CCNC: 30 U/L (ref 10–40)
BILIRUB SERPL-MCNC: 1.2 MG/DL (ref 0.1–1)
BUN SERPL-MCNC: 17 MG/DL (ref 6–20)
CALCIUM SERPL-MCNC: 10.1 MG/DL (ref 8.7–10.5)
CHLORIDE SERPL-SCNC: 104 MMOL/L (ref 95–110)
CHOLEST SERPL-MCNC: 206 MG/DL (ref 120–199)
CHOLEST/HDLC SERPL: 3.4 {RATIO} (ref 2–5)
CO2 SERPL-SCNC: 28 MMOL/L (ref 23–29)
COMPLEXED PSA SERPL-MCNC: 0.35 NG/ML (ref 0–4)
CREAT SERPL-MCNC: 1 MG/DL (ref 0.5–1.4)
ERYTHROCYTE [DISTWIDTH] IN BLOOD BY AUTOMATED COUNT: 12.3 % (ref 11.5–14.5)
EST. GFR  (AFRICAN AMERICAN): >60 ML/MIN/1.73 M^2
EST. GFR  (NON AFRICAN AMERICAN): >60 ML/MIN/1.73 M^2
ESTIMATED AVG GLUCOSE: 77 MG/DL (ref 68–131)
GLUCOSE SERPL-MCNC: 99 MG/DL (ref 70–110)
HBA1C MFR BLD HPLC: 4.3 % (ref 4–5.6)
HCT VFR BLD AUTO: 44.5 % (ref 40–54)
HDLC SERPL-MCNC: 61 MG/DL (ref 40–75)
HDLC SERPL: 29.6 % (ref 20–50)
HGB BLD-MCNC: 14.7 G/DL (ref 14–18)
LDLC SERPL CALC-MCNC: 121.8 MG/DL (ref 63–159)
MCH RBC QN AUTO: 31.7 PG (ref 27–31)
MCHC RBC AUTO-ENTMCNC: 33 G/DL (ref 32–36)
MCV RBC AUTO: 96 FL (ref 82–98)
NONHDLC SERPL-MCNC: 145 MG/DL
PLATELET # BLD AUTO: 278 K/UL (ref 150–350)
PMV BLD AUTO: 9.9 FL (ref 9.2–12.9)
POTASSIUM SERPL-SCNC: 4.9 MMOL/L (ref 3.5–5.1)
PROT SERPL-MCNC: 7.8 G/DL (ref 6–8.4)
RBC # BLD AUTO: 4.64 M/UL (ref 4.6–6.2)
SODIUM SERPL-SCNC: 142 MMOL/L (ref 136–145)
T4 FREE SERPL-MCNC: 0.92 NG/DL (ref 0.71–1.51)
TRIGL SERPL-MCNC: 116 MG/DL (ref 30–150)
TSH SERPL DL<=0.005 MIU/L-ACNC: 4.47 UIU/ML (ref 0.4–4)
WBC # BLD AUTO: 5.67 K/UL (ref 3.9–12.7)

## 2020-09-29 PROCEDURE — 64483 NJX AA&/STRD TFRM EPI L/S 1: CPT | Mod: RT,,, | Performed by: RADIOLOGY

## 2020-09-29 PROCEDURE — 64483 NJX AA&/STRD TFRM EPI L/S 1: CPT | Mod: 59,LT,, | Performed by: RADIOLOGY

## 2020-09-29 PROCEDURE — 84153 ASSAY OF PSA TOTAL: CPT

## 2020-09-29 PROCEDURE — 80053 COMPREHEN METABOLIC PANEL: CPT

## 2020-09-29 PROCEDURE — 84443 ASSAY THYROID STIM HORMONE: CPT

## 2020-09-29 PROCEDURE — A4215 STERILE NEEDLE: HCPCS

## 2020-09-29 PROCEDURE — 81003 URINALYSIS AUTO W/O SCOPE: CPT

## 2020-09-29 PROCEDURE — 64483 IR EPIDURAL TRANSFORAMINAL INJ 1ST VERT LUMBAR BILAT: ICD-10-PCS | Mod: RT,,, | Performed by: RADIOLOGY

## 2020-09-29 PROCEDURE — 85027 COMPLETE CBC AUTOMATED: CPT

## 2020-09-29 PROCEDURE — 25500020 PHARM REV CODE 255: Performed by: ORTHOPAEDIC SURGERY

## 2020-09-29 PROCEDURE — 82306 VITAMIN D 25 HYDROXY: CPT

## 2020-09-29 PROCEDURE — 80061 LIPID PANEL: CPT

## 2020-09-29 PROCEDURE — 63600175 PHARM REV CODE 636 W HCPCS: Performed by: RADIOLOGY

## 2020-09-29 PROCEDURE — 84439 ASSAY OF FREE THYROXINE: CPT

## 2020-09-29 PROCEDURE — 83036 HEMOGLOBIN GLYCOSYLATED A1C: CPT

## 2020-09-29 RX ORDER — METHYLPREDNISOLONE ACETATE 40 MG/ML
INJECTION, SUSPENSION INTRA-ARTICULAR; INTRALESIONAL; INTRAMUSCULAR; SOFT TISSUE CODE/TRAUMA/SEDATION MEDICATION
Status: COMPLETED | OUTPATIENT
Start: 2020-09-29 | End: 2020-09-29

## 2020-09-29 RX ADMIN — METHYLPREDNISOLONE ACETATE 80 MG: 40 INJECTION, SUSPENSION INTRA-ARTICULAR; INTRALESIONAL; INTRAMUSCULAR; SOFT TISSUE at 10:09

## 2020-09-29 RX ADMIN — IOHEXOL 4 ML: 180 INJECTION INTRAVENOUS at 10:09

## 2020-09-29 NOTE — PROCEDURES
Radiology Post-Procedure Note    Pre Op Diagnosis: LBP    Post Op Diagnosis: Same    Procedure: Lumbar Transforaminal BETH    Procedure performed by: Woo Hammer MD    Written Informed Consent Obtained: Yes    Specimen Removed: NO    Estimated Blood Loss: Minimal    Level injected: L4-L5 bilateral  Needle used: 22 gauge  Dose:  80 mg Depo-methylprednisolone   4 mL Lidocaine 1% MPF    Patient tolerated procedure well.    Woo Hammer MD  Attending Radiologist  Interventional Neuroradiology

## 2020-09-29 NOTE — H&P
Radiology History & Physical      SUBJECTIVE:     Chief Complaint: lower back pain    History of Present Illness:  Steve Carranza is a 59 y.o. male who presents for bilateral L4 / 5 TF BETH in patient with lumbar radiculopathy    Past Medical History:   Diagnosis Date    Hypertension      Past Surgical History:   Procedure Laterality Date    COLONOSCOPY  06/2016    KNEE SURGERY      bilateral    REFRACTIVE SURGERY      TONSILLECTOMY         Home Meds:   Prior to Admission medications    Medication Sig Start Date End Date Taking? Authorizing Provider   amlodipine (NORVASC) 10 MG tablet Take 10 mg by mouth once daily.    Historical Provider   aspirin (ECOTRIN) 81 MG EC tablet Take 81 mg by mouth once daily.    Historical Provider   clonazePAM (KLONOPIN) 0.5 MG tablet Take 1 tablet (0.5 mg total) by mouth 3 (three) times daily. 9/16/20   Henri Queen MD   escitalopram oxalate (LEXAPRO) 10 MG tablet Take 1 tablet (10 mg total) by mouth once daily. 8/1/18   William Ramires MD   metoprolol succinate (TOPROL-XL) 100 MG 24 hr tablet TAKE 1 TABLET(100 MG) BY MOUTH EVERY EVENING 5/8/18   Daniel Lozano MD   metoprolol succinate (TOPROL-XL) 100 MG 24 hr tablet TAKE 1 TABLET(100 MG) BY MOUTH EVERY EVENING 1/23/19   Daniel Lozano MD   multivitamin capsule Take 1 capsule by mouth once daily.    Historical Provider   omeprazole (PRILOSEC) 20 MG capsule Take 1 capsule (20 mg total) by mouth once daily. As needed 1/30/17   Daniel Lozano MD     Anticoagulants/Antiplatelets: no anticoagulation    Allergies:   Review of patient's allergies indicates:   Allergen Reactions    Penicillins      Sedation History:  no adverse reactions    Review of Systems:   Hematological: no known coagulopathies  Respiratory: no shortness of breath  Cardiovascular: no chest pain  Gastrointestinal: no abdominal pain  Genito-Urinary: no dysuria  Musculoskeletal: negative  Neurological: no TIA or stroke symptoms         OBJECTIVE:      Vital Signs (Most Recent)  Pulse: 87 (09/29/20 1030)  Resp: 14 (09/29/20 1030)  BP: (!) 169/92 (09/29/20 1030)  SpO2: 100 % (09/29/20 1030)    Physical Exam:  ASA: 2  Mallampati: 2    General: no acute distress  Mental Status: alert and oriented to person, place and time  HEENT: normocephalic, atraumatic  Chest: unlabored breathing  Heart: regular heart rate  Abdomen: nondistended  Extremity: moves all extremities    Laboratory  No results found for: INR    Lab Results   Component Value Date    WBC 5.67 09/29/2020    HGB 14.7 09/29/2020    HCT 44.5 09/29/2020    MCV 96 09/29/2020     09/29/2020      Lab Results   Component Value Date    GLU 99 09/29/2020     09/29/2020    K 4.9 09/29/2020     09/29/2020    CO2 28 09/29/2020    BUN 17 09/29/2020    CREATININE 1.0 09/29/2020    CALCIUM 10.1 09/29/2020    ALT 36 09/29/2020    AST 30 09/29/2020    ALBUMIN 4.8 09/29/2020    BILITOT 1.2 (H) 09/29/2020       ASSESSMENT/PLAN:     Sedation Plan: local anesthesia  Patient will undergo : b/l L4 - 5 TF BETH.    Roberto Eckert MD  NeuroIR fellow.

## 2020-09-29 NOTE — NURSING
Pt tolerated bilateral L4-L5 transforaminal epidural space injection well. VSS. Discharge instructions given.

## 2020-09-29 NOTE — TELEPHONE ENCOUNTER
I called Steve last night and reviewed his lumbar spine MRI. L3-4 and L4-5 level spondylosis with disc herniation and a small area of focal moderate neural foraminal narrowing at the right L4-5 level which correlates with his symptoms.  Treatment options discussed.  This has been off and on issue for the patient dating back to last spring.  The patient has had prior conservative treatment to include oral medication, formal physical therapy, activity modifications and rest.  Currently right buttock and leg pain is constant.  Plan to proceed with an epidural steroid injection today with Dr. Fran Hammer. Right L4-5 TF.

## 2020-09-30 LAB
BILIRUB UR QL STRIP: NEGATIVE
CLARITY UR REFRACT.AUTO: CLEAR
COLOR UR AUTO: YELLOW
GLUCOSE UR QL STRIP: NEGATIVE
HGB UR QL STRIP: NEGATIVE
KETONES UR QL STRIP: NEGATIVE
LEUKOCYTE ESTERASE UR QL STRIP: NEGATIVE
NITRITE UR QL STRIP: NEGATIVE
PH UR STRIP: 6 [PH] (ref 5–8)
PROT UR QL STRIP: NEGATIVE
SP GR UR STRIP: 1.02 (ref 1–1.03)
URN SPEC COLLECT METH UR: NORMAL

## 2020-10-11 ENCOUNTER — DOCUMENTATION ONLY (OUTPATIENT)
Dept: INTERNAL MEDICINE | Facility: CLINIC | Age: 60
End: 2020-10-11

## 2020-10-11 NOTE — PROGRESS NOTES
Date of evaluation 09/30/2020    History and physical exam    History of present illness:  This is a 59-year-old male well known to me who presents for his annual history and physical.  He is currently without any specific complaints.    Past medical history:  Hypertension and rare gastroesophageal reflux disease    Medications:  Amlodipine 10 mg daily, candesartan 8 mg daily, aspirin 81 mg daily, and clonazepam p.r.n. anxiety which he uses rarely    Allergies:  Penicillin allergy with an unclear reaction that occurred when patient was in his youth    Family history:  His father passed away at age of 50 from complications of alcohol. He had a history of hypertension.  His mother is 79 and has peripheral vascular disease with a history of a left leg amputation.  He has sisters who are 60 and 51 both of whom are healthy.    Social history:  Negative for any cigarette use.  Social alcohol use    Review of systems:  Negative for any exertional chest pain or unusual shortness of breath.  No melena no hematochezia.    Screening history:  The patient had a 3 mm polyp that was a tubular adenoma on a colonoscopy in June of 2015.  The recommendation was to have the patient repeat his colonoscopy in 5 years which would be this year.  Patient is planning on having that colonoscopy in the next month and it will be reported on the later physical.    Physical examination    Blood pressure was 129/81    HEENT:  Mouth and throat were clear    Neck:  No goiter no adenopathy    Chest:  Clear to auscultation bilaterally    Cardiovascular:  Regular rate and rhythm no murmurs or gallops    Abdomen:  Soft nontender positive bowel sounds    Extremities:  No edema    Laboratory data: Urinalysis was within normal limits.  Vitamin-D level was slightly low at 28.  Hemoglobin A1c was 4.3 and within normal limits.  PSA was 0.35 and within normal limits.  TSH was mildly elevated at 4.47 with a normal free T4.  Total cholesterol was 209, HDL or  good cholesterol was 61 LDL or bad cholesterol was 121.  CBC was unremarkable.  CMP was unremarkable.    Assessment:    1.  Hypertension with good control  2.  Mildly low vitamin-D level  3.  Rare gastroesophageal reflux disease    Recommendations:  Continue current medications.  A colonoscopy will be performed later this year.

## 2021-01-04 ENCOUNTER — TELEPHONE (OUTPATIENT)
Dept: SPORTS MEDICINE | Facility: CLINIC | Age: 61
End: 2021-01-04

## 2021-01-04 DIAGNOSIS — G89.29 CHRONIC LEFT-SIDED LOW BACK PAIN WITHOUT SCIATICA: Primary | ICD-10-CM

## 2021-01-04 DIAGNOSIS — M54.50 CHRONIC LEFT-SIDED LOW BACK PAIN WITHOUT SCIATICA: Primary | ICD-10-CM

## 2021-01-04 RX ORDER — MELOXICAM 15 MG/1
15 TABLET ORAL DAILY
Qty: 60 TABLET | Refills: 2 | Status: SHIPPED | OUTPATIENT
Start: 2021-01-04 | End: 2021-02-03 | Stop reason: SDUPTHER

## 2021-01-08 ENCOUNTER — TELEPHONE (OUTPATIENT)
Dept: SPORTS MEDICINE | Facility: CLINIC | Age: 61
End: 2021-01-08

## 2021-01-08 DIAGNOSIS — M62.830 LUMBAR PARASPINAL MUSCLE SPASM: Primary | ICD-10-CM

## 2021-01-08 RX ORDER — CHLORZOXAZONE 500 MG/1
TABLET ORAL
Qty: 60 TABLET | Refills: 0 | Status: SHIPPED | OUTPATIENT
Start: 2021-01-08 | End: 2021-10-07 | Stop reason: ALTCHOICE

## 2021-02-03 DIAGNOSIS — M54.50 CHRONIC LEFT-SIDED LOW BACK PAIN WITHOUT SCIATICA: ICD-10-CM

## 2021-02-03 DIAGNOSIS — G89.29 CHRONIC LEFT-SIDED LOW BACK PAIN WITHOUT SCIATICA: ICD-10-CM

## 2021-02-03 RX ORDER — MELOXICAM 15 MG/1
15 TABLET ORAL DAILY
Qty: 30 TABLET | Refills: 2 | Status: SHIPPED | OUTPATIENT
Start: 2021-02-03 | End: 2021-10-06 | Stop reason: SDUPTHER

## 2021-04-02 ENCOUNTER — TELEPHONE (OUTPATIENT)
Dept: SPORTS MEDICINE | Facility: CLINIC | Age: 61
End: 2021-04-02

## 2021-04-02 DIAGNOSIS — G89.29 CHRONIC LEFT-SIDED LOW BACK PAIN WITHOUT SCIATICA: ICD-10-CM

## 2021-04-02 DIAGNOSIS — M54.50 CHRONIC LEFT-SIDED LOW BACK PAIN WITHOUT SCIATICA: ICD-10-CM

## 2021-04-02 DIAGNOSIS — M62.830 LUMBAR PARASPINAL MUSCLE SPASM: Primary | ICD-10-CM

## 2021-04-05 ENCOUNTER — HOSPITAL ENCOUNTER (OUTPATIENT)
Dept: RADIOLOGY | Facility: HOSPITAL | Age: 61
Discharge: HOME | End: 2021-04-05
Attending: ORTHOPAEDIC SURGERY
Payer: COMMERCIAL

## 2021-04-05 DIAGNOSIS — M62.830 LUMBAR PARASPINAL MUSCLE SPASM: ICD-10-CM

## 2021-04-05 DIAGNOSIS — M54.50 CHRONIC LEFT-SIDED LOW BACK PAIN WITHOUT SCIATICA: ICD-10-CM

## 2021-04-05 DIAGNOSIS — Z00.00 ROUTINE GENERAL MEDICAL EXAMINATION AT A HEALTH CARE FACILITY: Primary | ICD-10-CM

## 2021-04-05 DIAGNOSIS — G89.29 CHRONIC LEFT-SIDED LOW BACK PAIN WITHOUT SCIATICA: ICD-10-CM

## 2021-04-05 PROCEDURE — 72148 MRI LUMBAR SPINE W/O DYE: CPT | Mod: 26,,, | Performed by: RADIOLOGY

## 2021-04-05 PROCEDURE — 72148 MRI LUMBAR SPINE W/O DYE: CPT | Mod: TC

## 2021-04-05 PROCEDURE — 72148 MRI LUMBAR SPINE WITHOUT CONTRAST: ICD-10-PCS | Mod: 26,,, | Performed by: RADIOLOGY

## 2021-04-06 ENCOUNTER — TELEPHONE (OUTPATIENT)
Dept: SPORTS MEDICINE | Facility: CLINIC | Age: 61
End: 2021-04-06

## 2021-04-14 DIAGNOSIS — Z00.00 ROUTINE GENERAL MEDICAL EXAMINATION AT A HEALTH CARE FACILITY: Primary | ICD-10-CM

## 2021-04-22 ENCOUNTER — HOSPITAL ENCOUNTER (OUTPATIENT)
Dept: CARDIOLOGY | Facility: HOSPITAL | Age: 61
Discharge: HOME | End: 2021-04-22
Attending: INTERNAL MEDICINE
Payer: COMMERCIAL

## 2021-04-22 ENCOUNTER — CLINICAL SUPPORT (OUTPATIENT)
Dept: INTERNAL MEDICINE | Facility: CLINIC | Age: 61
End: 2021-04-22
Payer: COMMERCIAL

## 2021-04-22 ENCOUNTER — CLINICAL SUPPORT (OUTPATIENT)
Dept: CARDIOLOGY | Facility: CLINIC | Age: 61
End: 2021-04-22
Payer: COMMERCIAL

## 2021-04-22 VITALS — HEIGHT: 70 IN | WEIGHT: 225 LBS | BODY MASS INDEX: 32.21 KG/M2

## 2021-04-22 DIAGNOSIS — F41.9 ANXIETY: ICD-10-CM

## 2021-04-22 DIAGNOSIS — K21.9 GASTROESOPHAGEAL REFLUX DISEASE, UNSPECIFIED WHETHER ESOPHAGITIS PRESENT: ICD-10-CM

## 2021-04-22 DIAGNOSIS — Z00.00 WELLNESS EXAMINATION: ICD-10-CM

## 2021-04-22 DIAGNOSIS — E78.5 HYPERLIPIDEMIA, UNSPECIFIED HYPERLIPIDEMIA TYPE: ICD-10-CM

## 2021-04-22 DIAGNOSIS — Z00.00 ROUTINE GENERAL MEDICAL EXAMINATION AT A HEALTH CARE FACILITY: Primary | ICD-10-CM

## 2021-04-22 DIAGNOSIS — Z00.00 ROUTINE GENERAL MEDICAL EXAMINATION AT A HEALTH CARE FACILITY: ICD-10-CM

## 2021-04-22 DIAGNOSIS — I10 ESSENTIAL HYPERTENSION: ICD-10-CM

## 2021-04-22 LAB
25(OH)D3+25(OH)D2 SERPL-MCNC: 28 NG/ML (ref 30–96)
ALBUMIN SERPL BCP-MCNC: 4.8 G/DL (ref 3.5–5.2)
ALP SERPL-CCNC: 66 U/L (ref 55–135)
ALT SERPL W/O P-5'-P-CCNC: 27 U/L (ref 10–44)
ANION GAP SERPL CALC-SCNC: 13 MMOL/L (ref 8–16)
AST SERPL-CCNC: 27 U/L (ref 10–40)
BASOPHILS # BLD AUTO: 0.05 K/UL (ref 0–0.2)
BASOPHILS NFR BLD: 0.8 % (ref 0–1.9)
BILIRUB SERPL-MCNC: 1.1 MG/DL (ref 0.1–1)
BILIRUB UR QL STRIP: NEGATIVE
BUN SERPL-MCNC: 14 MG/DL (ref 6–20)
CALCIUM SERPL-MCNC: 9.6 MG/DL (ref 8.7–10.5)
CHLORIDE SERPL-SCNC: 104 MMOL/L (ref 95–110)
CHOLEST SERPL-MCNC: 206 MG/DL (ref 120–199)
CHOLEST/HDLC SERPL: 3.3 {RATIO} (ref 2–5)
CLARITY UR REFRACT.AUTO: CLEAR
CO2 SERPL-SCNC: 27 MMOL/L (ref 23–29)
COLOR UR AUTO: YELLOW
COMPLEXED PSA SERPL-MCNC: 0.39 NG/ML (ref 0–4)
CREAT SERPL-MCNC: 1 MG/DL (ref 0.5–1.4)
CV STRESS BASE HR: 62 BPM
DIASTOLIC BLOOD PRESSURE: 73 MMHG
DIFFERENTIAL METHOD: ABNORMAL
EOSINOPHIL # BLD AUTO: 0.2 K/UL (ref 0–0.5)
EOSINOPHIL NFR BLD: 2.9 % (ref 0–8)
ERYTHROCYTE [DISTWIDTH] IN BLOOD BY AUTOMATED COUNT: 12.6 % (ref 11.5–14.5)
EST. GFR  (AFRICAN AMERICAN): >60 ML/MIN/1.73 M^2
EST. GFR  (NON AFRICAN AMERICAN): >60 ML/MIN/1.73 M^2
ESTIMATED AVG GLUCOSE: 82 MG/DL (ref 68–131)
GLUCOSE SERPL-MCNC: 94 MG/DL (ref 70–110)
GLUCOSE UR QL STRIP: NEGATIVE
HBA1C MFR BLD: 4.5 % (ref 4–5.6)
HCT VFR BLD AUTO: 42.4 % (ref 40–54)
HDLC SERPL-MCNC: 62 MG/DL (ref 40–75)
HDLC SERPL: 30.1 % (ref 20–50)
HGB BLD-MCNC: 14.8 G/DL (ref 14–18)
HGB UR QL STRIP: NEGATIVE
IMM GRANULOCYTES # BLD AUTO: 0.03 K/UL (ref 0–0.04)
IMM GRANULOCYTES NFR BLD AUTO: 0.5 % (ref 0–0.5)
KETONES UR QL STRIP: NEGATIVE
LDLC SERPL CALC-MCNC: 129 MG/DL (ref 63–159)
LEUKOCYTE ESTERASE UR QL STRIP: NEGATIVE
LYMPHOCYTES # BLD AUTO: 1.9 K/UL (ref 1–4.8)
LYMPHOCYTES NFR BLD: 29.3 % (ref 18–48)
MCH RBC QN AUTO: 31.7 PG (ref 27–31)
MCHC RBC AUTO-ENTMCNC: 34.9 G/DL (ref 32–36)
MCV RBC AUTO: 91 FL (ref 82–98)
MONOCYTES # BLD AUTO: 0.6 K/UL (ref 0.3–1)
MONOCYTES NFR BLD: 9.2 % (ref 4–15)
NEUTROPHILS # BLD AUTO: 3.6 K/UL (ref 1.8–7.7)
NEUTROPHILS NFR BLD: 57.3 % (ref 38–73)
NITRITE UR QL STRIP: NEGATIVE
NONHDLC SERPL-MCNC: 144 MG/DL
NRBC BLD-RTO: 0 /100 WBC
OHS CV CPX 1 MINUTE RECOVERY HEART RATE: 127 BPM
OHS CV CPX 85 PERCENT MAX PREDICTED HEART RATE MALE: 138
OHS CV CPX ESTIMATED METS: 13
OHS CV CPX MAX PREDICTED HEART RATE: 162
OHS CV CPX PATIENT IS FEMALE: 0
OHS CV CPX PATIENT IS MALE: 1
OHS CV CPX PEAK DIASTOLIC BLOOD PRESSURE: 65 MMHG
OHS CV CPX PEAK HEAR RATE: 157 BPM
OHS CV CPX PEAK RATE PRESSURE PRODUCT: NORMAL
OHS CV CPX PEAK SYSTOLIC BLOOD PRESSURE: 184 MMHG
OHS CV CPX PERCENT MAX PREDICTED HEART RATE ACHIEVED: 97
OHS CV CPX RATE PRESSURE PRODUCT PRESENTING: 8122
PH UR STRIP: 6 [PH] (ref 5–8)
PLATELET # BLD AUTO: 253 K/UL (ref 150–450)
PMV BLD AUTO: 9.9 FL (ref 9.2–12.9)
POTASSIUM SERPL-SCNC: 4.5 MMOL/L (ref 3.5–5.1)
PROT SERPL-MCNC: 7.7 G/DL (ref 6–8.4)
PROT UR QL STRIP: NEGATIVE
RBC # BLD AUTO: 4.67 M/UL (ref 4.6–6.2)
SODIUM SERPL-SCNC: 144 MMOL/L (ref 136–145)
SP GR UR STRIP: 1.02 (ref 1–1.03)
STRESS ECHO POST EXERCISE DUR MIN: 8 MINUTES
STRESS ECHO POST EXERCISE DUR SEC: 0 SECONDS
SYSTOLIC BLOOD PRESSURE: 131 MMHG
T4 FREE SERPL-MCNC: 0.93 NG/DL (ref 0.71–1.51)
TRIGL SERPL-MCNC: 75 MG/DL (ref 30–150)
TSH SERPL DL<=0.005 MIU/L-ACNC: 4.27 UIU/ML (ref 0.4–4)
URN SPEC COLLECT METH UR: NORMAL
WBC # BLD AUTO: 6.31 K/UL (ref 3.9–12.7)

## 2021-04-22 PROCEDURE — 93016 CV STRESS TEST SUPVJ ONLY: CPT | Mod: ,,, | Performed by: INTERNAL MEDICINE

## 2021-04-22 PROCEDURE — 99214 OFFICE O/P EST MOD 30 MIN: CPT | Mod: 95,,, | Performed by: INTERNAL MEDICINE

## 2021-04-22 PROCEDURE — 80053 COMPREHEN METABOLIC PANEL: CPT | Performed by: INTERNAL MEDICINE

## 2021-04-22 PROCEDURE — 85025 COMPLETE CBC W/AUTO DIFF WBC: CPT | Performed by: INTERNAL MEDICINE

## 2021-04-22 PROCEDURE — 93016 EXERCISE STRESS - EKG (CUPID ONLY): ICD-10-PCS | Mod: ,,, | Performed by: INTERNAL MEDICINE

## 2021-04-22 PROCEDURE — 80061 LIPID PANEL: CPT | Performed by: INTERNAL MEDICINE

## 2021-04-22 PROCEDURE — 83036 HEMOGLOBIN GLYCOSYLATED A1C: CPT | Performed by: INTERNAL MEDICINE

## 2021-04-22 PROCEDURE — 84153 ASSAY OF PSA TOTAL: CPT | Performed by: INTERNAL MEDICINE

## 2021-04-22 PROCEDURE — 81003 URINALYSIS AUTO W/O SCOPE: CPT | Performed by: INTERNAL MEDICINE

## 2021-04-22 PROCEDURE — 84443 ASSAY THYROID STIM HORMONE: CPT | Performed by: INTERNAL MEDICINE

## 2021-04-22 PROCEDURE — 93018 CV STRESS TEST I&R ONLY: CPT | Mod: ,,, | Performed by: INTERNAL MEDICINE

## 2021-04-22 PROCEDURE — 82306 VITAMIN D 25 HYDROXY: CPT | Performed by: INTERNAL MEDICINE

## 2021-04-22 PROCEDURE — 84439 ASSAY OF FREE THYROXINE: CPT | Performed by: INTERNAL MEDICINE

## 2021-04-22 PROCEDURE — 93018 EXERCISE STRESS - EKG (CUPID ONLY): ICD-10-PCS | Mod: ,,, | Performed by: INTERNAL MEDICINE

## 2021-04-22 PROCEDURE — 99214 PR OFFICE/OUTPT VISIT, EST, LEVL IV, 30-39 MIN: ICD-10-PCS | Mod: 95,,, | Performed by: INTERNAL MEDICINE

## 2021-04-22 PROCEDURE — 93017 CV STRESS TEST TRACING ONLY: CPT

## 2021-05-23 ENCOUNTER — DOCUMENTATION ONLY (OUTPATIENT)
Dept: INTERNAL MEDICINE | Facility: CLINIC | Age: 61
End: 2021-05-23

## 2021-07-26 PROBLEM — Z00.00 WELLNESS EXAMINATION: Status: RESOLVED | Noted: 2021-04-22 | Resolved: 2021-07-26

## 2021-08-02 RX ORDER — CLONAZEPAM 0.5 MG/1
0.5 TABLET ORAL 3 TIMES DAILY
Qty: 90 TABLET | Refills: 0 | Status: SHIPPED | OUTPATIENT
Start: 2021-08-02 | End: 2021-08-04 | Stop reason: SDUPTHER

## 2021-08-04 ENCOUNTER — OFFICE VISIT (OUTPATIENT)
Dept: PSYCHIATRY | Facility: CLINIC | Age: 61
End: 2021-08-04
Payer: COMMERCIAL

## 2021-08-04 DIAGNOSIS — F41.1 GAD (GENERALIZED ANXIETY DISORDER): Primary | ICD-10-CM

## 2021-08-04 PROCEDURE — 3044F PR MOST RECENT HEMOGLOBIN A1C LEVEL <7.0%: ICD-10-PCS | Mod: CPTII,95,, | Performed by: PSYCHIATRY & NEUROLOGY

## 2021-08-04 PROCEDURE — 99214 OFFICE O/P EST MOD 30 MIN: CPT | Mod: 95,,, | Performed by: PSYCHIATRY & NEUROLOGY

## 2021-08-04 PROCEDURE — 99214 PR OFFICE/OUTPT VISIT, EST, LEVL IV, 30-39 MIN: ICD-10-PCS | Mod: 95,,, | Performed by: PSYCHIATRY & NEUROLOGY

## 2021-08-04 PROCEDURE — 3044F HG A1C LEVEL LT 7.0%: CPT | Mod: CPTII,95,, | Performed by: PSYCHIATRY & NEUROLOGY

## 2021-08-04 RX ORDER — CLONAZEPAM 0.5 MG/1
0.5 TABLET ORAL 3 TIMES DAILY
Qty: 90 TABLET | Refills: 5 | Status: SHIPPED | OUTPATIENT
Start: 2021-08-31 | End: 2022-01-19

## 2021-10-06 ENCOUNTER — TELEPHONE (OUTPATIENT)
Dept: SPORTS MEDICINE | Facility: CLINIC | Age: 61
End: 2021-10-06

## 2021-10-06 DIAGNOSIS — G89.29 CHRONIC LEFT-SIDED LOW BACK PAIN WITHOUT SCIATICA: ICD-10-CM

## 2021-10-06 DIAGNOSIS — M54.50 CHRONIC LEFT-SIDED LOW BACK PAIN WITHOUT SCIATICA: ICD-10-CM

## 2021-10-06 RX ORDER — MELOXICAM 15 MG/1
15 TABLET ORAL DAILY
Qty: 30 TABLET | Refills: 2 | Status: SHIPPED | OUTPATIENT
Start: 2021-10-06 | End: 2022-08-04 | Stop reason: SDUPTHER

## 2021-10-07 ENCOUNTER — TELEPHONE (OUTPATIENT)
Dept: SPORTS MEDICINE | Facility: CLINIC | Age: 61
End: 2021-10-07

## 2021-10-07 DIAGNOSIS — G89.29 CHRONIC LEFT-SIDED LOW BACK PAIN WITHOUT SCIATICA: Primary | ICD-10-CM

## 2021-10-07 DIAGNOSIS — M54.50 CHRONIC LEFT-SIDED LOW BACK PAIN WITHOUT SCIATICA: Primary | ICD-10-CM

## 2021-10-07 RX ORDER — METHYLPREDNISOLONE 4 MG/1
TABLET ORAL
Qty: 1 PACKAGE | Refills: 0 | Status: SHIPPED | OUTPATIENT
Start: 2021-10-07 | End: 2022-01-19

## 2021-10-07 RX ORDER — CYCLOBENZAPRINE HCL 10 MG
10 TABLET ORAL 3 TIMES DAILY PRN
Qty: 60 TABLET | Refills: 1 | Status: SHIPPED | OUTPATIENT
Start: 2021-10-07 | End: 2021-10-17

## 2021-12-14 ENCOUNTER — IMMUNIZATION (OUTPATIENT)
Dept: PRIMARY CARE CLINIC | Facility: CLINIC | Age: 61
End: 2021-12-14
Payer: COMMERCIAL

## 2021-12-14 DIAGNOSIS — Z23 NEED FOR VACCINATION: Primary | ICD-10-CM

## 2021-12-14 PROCEDURE — 0004A COVID-19, MRNA, LNP-S, PF, 30 MCG/0.3 ML DOSE VACCINE: CPT | Mod: CV19,PBBFAC | Performed by: INTERNAL MEDICINE

## 2022-01-18 ENCOUNTER — HOSPITAL ENCOUNTER (OUTPATIENT)
Dept: RADIOLOGY | Facility: OTHER | Age: 62
Discharge: HOME | End: 2022-01-18
Attending: INTERNAL MEDICINE
Payer: COMMERCIAL

## 2022-01-18 DIAGNOSIS — Z87.19 PERSONAL HISTORY OF GALLSTONES: ICD-10-CM

## 2022-01-18 DIAGNOSIS — Z00.00 ROUTINE GENERAL MEDICAL EXAMINATION AT A HEALTH CARE FACILITY: Primary | ICD-10-CM

## 2022-01-18 PROCEDURE — 76700 US ABDOMEN COMPLETE: ICD-10-PCS | Mod: 26,,, | Performed by: RADIOLOGY

## 2022-01-18 PROCEDURE — 76700 US EXAM ABDOM COMPLETE: CPT | Mod: 26,,, | Performed by: RADIOLOGY

## 2022-01-18 PROCEDURE — 76700 US EXAM ABDOM COMPLETE: CPT | Mod: TC

## 2022-01-19 ENCOUNTER — NURSE TRIAGE (OUTPATIENT)
Dept: ADMINISTRATIVE | Facility: CLINIC | Age: 62
End: 2022-01-19
Payer: COMMERCIAL

## 2022-01-19 ENCOUNTER — OFFICE VISIT (OUTPATIENT)
Dept: SURGERY | Facility: CLINIC | Age: 62
End: 2022-01-19
Payer: COMMERCIAL

## 2022-01-19 ENCOUNTER — CLINICAL SUPPORT (OUTPATIENT)
Dept: INTERNAL MEDICINE | Facility: CLINIC | Age: 62
End: 2022-01-19
Payer: COMMERCIAL

## 2022-01-19 ENCOUNTER — PATIENT MESSAGE (OUTPATIENT)
Dept: SURGERY | Facility: CLINIC | Age: 62
End: 2022-01-19

## 2022-01-19 VITALS
WEIGHT: 210.75 LBS | SYSTOLIC BLOOD PRESSURE: 133 MMHG | HEART RATE: 58 BPM | HEIGHT: 70 IN | DIASTOLIC BLOOD PRESSURE: 81 MMHG | BODY MASS INDEX: 30.17 KG/M2

## 2022-01-19 DIAGNOSIS — Z01.818 PREOP TESTING: ICD-10-CM

## 2022-01-19 DIAGNOSIS — K80.20 SYMPTOMATIC CHOLELITHIASIS: Primary | ICD-10-CM

## 2022-01-19 LAB
ALBUMIN SERPL BCP-MCNC: 4.7 G/DL (ref 3.5–5.2)
ALP SERPL-CCNC: 61 U/L (ref 55–135)
ALT SERPL W/O P-5'-P-CCNC: 29 U/L (ref 10–44)
ANION GAP SERPL CALC-SCNC: 10 MMOL/L (ref 8–16)
AST SERPL-CCNC: 26 U/L (ref 10–40)
BASOPHILS # BLD AUTO: 0.03 K/UL (ref 0–0.2)
BASOPHILS NFR BLD: 0.5 % (ref 0–1.9)
BILIRUB SERPL-MCNC: 1.1 MG/DL (ref 0.1–1)
BUN SERPL-MCNC: 16 MG/DL (ref 6–20)
CALCIUM SERPL-MCNC: 10.2 MG/DL (ref 8.7–10.5)
CHLORIDE SERPL-SCNC: 105 MMOL/L (ref 95–110)
CO2 SERPL-SCNC: 28 MMOL/L (ref 23–29)
CREAT SERPL-MCNC: 0.8 MG/DL (ref 0.5–1.4)
DIFFERENTIAL METHOD: ABNORMAL
EOSINOPHIL # BLD AUTO: 0.4 K/UL (ref 0–0.5)
EOSINOPHIL NFR BLD: 5.7 % (ref 0–8)
ERYTHROCYTE [DISTWIDTH] IN BLOOD BY AUTOMATED COUNT: 12 % (ref 11.5–14.5)
EST. GFR  (AFRICAN AMERICAN): >60 ML/MIN/1.73 M^2
EST. GFR  (NON AFRICAN AMERICAN): >60 ML/MIN/1.73 M^2
GLUCOSE SERPL-MCNC: 88 MG/DL (ref 70–110)
HCT VFR BLD AUTO: 40.9 % (ref 40–54)
HGB BLD-MCNC: 14 G/DL (ref 14–18)
IMM GRANULOCYTES # BLD AUTO: 0.03 K/UL (ref 0–0.04)
IMM GRANULOCYTES NFR BLD AUTO: 0.5 % (ref 0–0.5)
LYMPHOCYTES # BLD AUTO: 1.9 K/UL (ref 1–4.8)
LYMPHOCYTES NFR BLD: 29.7 % (ref 18–48)
MCH RBC QN AUTO: 32 PG (ref 27–31)
MCHC RBC AUTO-ENTMCNC: 34.2 G/DL (ref 32–36)
MCV RBC AUTO: 94 FL (ref 82–98)
MONOCYTES # BLD AUTO: 0.7 K/UL (ref 0.3–1)
MONOCYTES NFR BLD: 11.6 % (ref 4–15)
NEUTROPHILS # BLD AUTO: 3.3 K/UL (ref 1.8–7.7)
NEUTROPHILS NFR BLD: 52 % (ref 38–73)
NRBC BLD-RTO: 0 /100 WBC
PLATELET # BLD AUTO: 299 K/UL (ref 150–450)
PMV BLD AUTO: 9.9 FL (ref 9.2–12.9)
POTASSIUM SERPL-SCNC: 5.9 MMOL/L (ref 3.5–5.1)
PROT SERPL-MCNC: 7.4 G/DL (ref 6–8.4)
RBC # BLD AUTO: 4.37 M/UL (ref 4.6–6.2)
SODIUM SERPL-SCNC: 143 MMOL/L (ref 136–145)
WBC # BLD AUTO: 6.37 K/UL (ref 3.9–12.7)

## 2022-01-19 PROCEDURE — 99999 PR PBB SHADOW E&M-EST. PATIENT-LVL II: ICD-10-PCS | Mod: PBBFAC,,,

## 2022-01-19 PROCEDURE — 1160F RVW MEDS BY RX/DR IN RCRD: CPT | Mod: CPTII,S$GLB,, | Performed by: SURGERY

## 2022-01-19 PROCEDURE — 3079F PR MOST RECENT DIASTOLIC BLOOD PRESSURE 80-89 MM HG: ICD-10-PCS | Mod: CPTII,S$GLB,, | Performed by: SURGERY

## 2022-01-19 PROCEDURE — 3075F PR MOST RECENT SYSTOLIC BLOOD PRESS GE 130-139MM HG: ICD-10-PCS | Mod: CPTII,S$GLB,, | Performed by: SURGERY

## 2022-01-19 PROCEDURE — 99204 PR OFFICE/OUTPT VISIT, NEW, LEVL IV, 45-59 MIN: ICD-10-PCS | Mod: S$GLB,,, | Performed by: SURGERY

## 2022-01-19 PROCEDURE — 99999 PR PBB SHADOW E&M-EST. PATIENT-LVL III: ICD-10-PCS | Mod: PBBFAC,,, | Performed by: SURGERY

## 2022-01-19 PROCEDURE — 80053 COMPREHEN METABOLIC PANEL: CPT | Performed by: SURGERY

## 2022-01-19 PROCEDURE — 3008F PR BODY MASS INDEX (BMI) DOCUMENTED: ICD-10-PCS | Mod: CPTII,S$GLB,, | Performed by: SURGERY

## 2022-01-19 PROCEDURE — 99999 PR PBB SHADOW E&M-EST. PATIENT-LVL II: CPT | Mod: PBBFAC,,,

## 2022-01-19 PROCEDURE — 3079F DIAST BP 80-89 MM HG: CPT | Mod: CPTII,S$GLB,, | Performed by: SURGERY

## 2022-01-19 PROCEDURE — 99999 PR PBB SHADOW E&M-EST. PATIENT-LVL III: CPT | Mod: PBBFAC,,, | Performed by: SURGERY

## 2022-01-19 PROCEDURE — 1159F PR MEDICATION LIST DOCUMENTED IN MEDICAL RECORD: ICD-10-PCS | Mod: CPTII,S$GLB,, | Performed by: SURGERY

## 2022-01-19 PROCEDURE — 1160F PR REVIEW ALL MEDS BY PRESCRIBER/CLIN PHARMACIST DOCUMENTED: ICD-10-PCS | Mod: CPTII,S$GLB,, | Performed by: SURGERY

## 2022-01-19 PROCEDURE — 85025 COMPLETE CBC W/AUTO DIFF WBC: CPT | Performed by: SURGERY

## 2022-01-19 PROCEDURE — 1159F MED LIST DOCD IN RCRD: CPT | Mod: CPTII,S$GLB,, | Performed by: SURGERY

## 2022-01-19 PROCEDURE — 3008F BODY MASS INDEX DOCD: CPT | Mod: CPTII,S$GLB,, | Performed by: SURGERY

## 2022-01-19 PROCEDURE — 99204 OFFICE O/P NEW MOD 45 MIN: CPT | Mod: S$GLB,,, | Performed by: SURGERY

## 2022-01-19 PROCEDURE — 3075F SYST BP GE 130 - 139MM HG: CPT | Mod: CPTII,S$GLB,, | Performed by: SURGERY

## 2022-01-19 RX ORDER — CEFAZOLIN SODIUM 2 G/50ML
2 SOLUTION INTRAVENOUS
Status: CANCELLED | OUTPATIENT
Start: 2022-01-19

## 2022-01-19 NOTE — PROGRESS NOTES
History & Physical    SUBJECTIVE:     History of Present Illness:  Patient is a 61 y.o. male presents with gallstones.  The patient has had 3 episodes of significant RUQ pain all after rich meals in the past several months.  His last episode was Monday night and lasted about 3 hours.  These episodes are associated with bloating.  He denies diarrhea.  A US yesterday did show some thickening of the gallbladder wall but no pericholecystic fluid/  The patient's pain has also totally subsided.  Interested in evmckinley flores leila.      Review of patient's allergies indicates:   Allergen Reactions    Penicillins        Current Outpatient Medications   Medication Sig Dispense Refill    amlodipine (NORVASC) 10 MG tablet Take 10 mg by mouth once daily.      meloxicam (MOBIC) 15 MG tablet Take 1 tablet (15 mg total) by mouth once daily. 30 tablet 2    metoprolol succinate (TOPROL-XL) 100 MG 24 hr tablet TAKE 1 TABLET(100 MG) BY MOUTH EVERY EVENING 90 tablet 0     No current facility-administered medications for this visit.       Past Medical History:   Diagnosis Date    Anxiety 4/22/2021    Hypertension      Past Surgical History:   Procedure Laterality Date    COLONOSCOPY  06/2016    KNEE SURGERY      bilateral    REFRACTIVE SURGERY      TONSILLECTOMY       Family History   Problem Relation Age of Onset    Glaucoma Mother     Anxiety disorder Neg Hx      Social History     Tobacco Use    Smoking status: Never Smoker    Smokeless tobacco: Never Used   Substance Use Topics    Alcohol use: Yes     Alcohol/week: 6.0 standard drinks     Types: 6 Glasses of wine per week     Comment: every couple of weeks    Drug use: No        Review of Systems:  Review of Systems   Constitutional: Negative for activity change, appetite change, chills, diaphoresis, fatigue and fever.   HENT: Negative for congestion, sinus pressure, sneezing and sore throat.    Eyes: Negative for pain, discharge, redness, itching and visual  "disturbance.   Respiratory: Negative for apnea, cough, choking, chest tightness and shortness of breath.    Cardiovascular: Negative for chest pain and palpitations.   Gastrointestinal: Positive for abdominal distention (Resolved), abdominal pain (Resolved) and constipation. Negative for diarrhea, nausea and vomiting.   Musculoskeletal: Negative for arthralgias, back pain and gait problem.   Skin: Negative for color change, pallor and rash.   Neurological: Negative for dizziness, facial asymmetry, light-headedness and headaches.   Psychiatric/Behavioral: Negative for agitation, behavioral problems and confusion.       OBJECTIVE:     Vital Signs (Most Recent)  Pulse: (!) 58 (01/19/22 1027)  BP: 133/81 (01/19/22 1027)  5' 9.5" (1.765 m)  95.6 kg (210 lb 12.2 oz)     Physical Exam:  Physical Exam  Constitutional:       General: He is not in acute distress.     Appearance: Normal appearance. He is not diaphoretic.   HENT:      Head: Normocephalic and atraumatic.      Right Ear: External ear normal.      Left Ear: External ear normal.   Eyes:      General: No scleral icterus.        Right eye: No discharge.         Left eye: No discharge.   Cardiovascular:      Rate and Rhythm: Normal rate and regular rhythm.   Pulmonary:      Effort: Pulmonary effort is normal. No respiratory distress.   Abdominal:      General: There is no distension.      Palpations: Abdomen is soft. There is no mass.      Tenderness: There is no abdominal tenderness. There is no guarding or rebound.      Hernia: No hernia is present.   Musculoskeletal:         General: Normal range of motion.   Skin:     General: Skin is warm and dry.      Coloration: Skin is not pale.      Findings: No erythema or rash.   Neurological:      Mental Status: He is alert and oriented to person, place, and time.   Psychiatric:         Mood and Affect: Mood normal.         Behavior: Behavior normal.         Thought Content: Thought content normal.         Judgment: " Judgment normal.           ASSESSMENT/PLAN:     Symptomatic Cholelithiasis    PLAN:Plan     Will plan for labs today and a lap leila in the future  Pt would like to wait until after [place]  He will contact us if he has another attack or pain worsens prior to this.  Instructed on s/sx of acute cholecystitis and instructed to present urgently if this happens  Suggested low fat diet  Will obtain consent AM of surgery.       Woo Lopez MD

## 2022-01-20 NOTE — TELEPHONE ENCOUNTER
Patient states he realized he will be out of town on the date he has his surgery scheduled. Would like to reschedule and wants the office staff to contact him to reschedule. Advised per protocol. Understanding verbalized.    Reason for Disposition   [1] Caller requesting NON-URGENT health information AND [2] PCP's office is the best resource    Protocols used: INFORMATION ONLY CALL - NO TRIAGE-A-

## 2022-01-21 ENCOUNTER — TELEPHONE (OUTPATIENT)
Dept: SURGERY | Facility: CLINIC | Age: 62
End: 2022-01-21
Payer: COMMERCIAL

## 2022-01-21 ENCOUNTER — DOCUMENTATION ONLY (OUTPATIENT)
Dept: SURGERY | Facility: CLINIC | Age: 62
End: 2022-01-21
Payer: COMMERCIAL

## 2022-01-21 ENCOUNTER — ANESTHESIA EVENT (OUTPATIENT)
Dept: SURGERY | Facility: HOSPITAL | Age: 62
End: 2022-01-21
Payer: COMMERCIAL

## 2022-01-21 NOTE — PROGRESS NOTES
Notified Dr. Lopez of the Patient's K+ level of 5.9 drawn on 1-19-22.  Sent a message and the lab results to his PCP - Dr. Regino Cochran per Dr. Lopez's request.

## 2022-01-21 NOTE — TELEPHONE ENCOUNTER
Spoke to Patient.  Arrival time of 0500 given to check in at the [facility] on the [facility]nd Floor of the Hospital on [facility].  Instructed to only have clear liquids between 11 pm Sunday night and the time that he leaves his house to come to the Hospital for surgery and then to remain NPO after that time, to wash up the night before surgery and the morning of surgery with an antibacterial soap.  Stated that he remembers and will follow his pre-op instructions that were given to him.  Reminded that he will have a Covid swab the morning of surgery.    Post-op appointment scheduled.  Patient was grateful that he was able to move his surgery to 1-24-22. Patient verbalized understanding of instructions and he did not have any questions at present.

## 2022-01-24 ENCOUNTER — ANESTHESIA (OUTPATIENT)
Dept: SURGERY | Facility: HOSPITAL | Age: 62
End: 2022-01-24
Payer: COMMERCIAL

## 2022-01-24 ENCOUNTER — HOSPITAL ENCOUNTER (OUTPATIENT)
Facility: HOSPITAL | Age: 62
Discharge: HOME | End: 2022-01-24
Attending: SURGERY | Admitting: SURGERY
Payer: COMMERCIAL

## 2022-01-24 VITALS
RESPIRATION RATE: 18 BRPM | BODY MASS INDEX: 31.1 KG/M2 | HEART RATE: 67 BPM | WEIGHT: 210 LBS | TEMPERATURE: 98 F | OXYGEN SATURATION: 94 % | DIASTOLIC BLOOD PRESSURE: 72 MMHG | SYSTOLIC BLOOD PRESSURE: 115 MMHG | HEIGHT: 69 IN

## 2022-01-24 DIAGNOSIS — K80.20 SYMPTOMATIC CHOLELITHIASIS: Primary | ICD-10-CM

## 2022-01-24 LAB
ALBUMIN SERPL BCP-MCNC: 4.5 G/DL (ref 3.5–5.2)
ALP SERPL-CCNC: 60 U/L (ref 55–135)
ALT SERPL W/O P-5'-P-CCNC: 24 U/L (ref 10–44)
ANION GAP SERPL CALC-SCNC: 11 MMOL/L (ref 8–16)
AST SERPL-CCNC: 29 U/L (ref 10–40)
BASOPHILS # BLD AUTO: 0.04 K/UL (ref 0–0.2)
BASOPHILS NFR BLD: 0.5 % (ref 0–1.9)
BILIRUB SERPL-MCNC: 1 MG/DL (ref 0.1–1)
BUN SERPL-MCNC: 12 MG/DL (ref 6–20)
CALCIUM SERPL-MCNC: 9.7 MG/DL (ref 8.7–10.5)
CHLORIDE SERPL-SCNC: 104 MMOL/L (ref 95–110)
CO2 SERPL-SCNC: 21 MMOL/L (ref 23–29)
CREAT SERPL-MCNC: 0.9 MG/DL (ref 0.5–1.4)
CTP QC/QA: YES
DIFFERENTIAL METHOD: ABNORMAL
EOSINOPHIL # BLD AUTO: 0.2 K/UL (ref 0–0.5)
EOSINOPHIL NFR BLD: 2.3 % (ref 0–8)
ERYTHROCYTE [DISTWIDTH] IN BLOOD BY AUTOMATED COUNT: 11.8 % (ref 11.5–14.5)
EST. GFR  (AFRICAN AMERICAN): >60 ML/MIN/1.73 M^2
EST. GFR  (NON AFRICAN AMERICAN): >60 ML/MIN/1.73 M^2
GLUCOSE SERPL-MCNC: 89 MG/DL (ref 70–110)
HCT VFR BLD AUTO: 36.7 % (ref 40–54)
HGB BLD-MCNC: 12.9 G/DL (ref 14–18)
IMM GRANULOCYTES # BLD AUTO: 0.02 K/UL (ref 0–0.04)
IMM GRANULOCYTES NFR BLD AUTO: 0.2 % (ref 0–0.5)
LYMPHOCYTES # BLD AUTO: 1.2 K/UL (ref 1–4.8)
LYMPHOCYTES NFR BLD: 13.7 % (ref 18–48)
MCH RBC QN AUTO: 31.7 PG (ref 27–31)
MCHC RBC AUTO-ENTMCNC: 35.1 G/DL (ref 32–36)
MCV RBC AUTO: 90 FL (ref 82–98)
MONOCYTES # BLD AUTO: 0.8 K/UL (ref 0.3–1)
MONOCYTES NFR BLD: 8.7 % (ref 4–15)
NEUTROPHILS # BLD AUTO: 6.5 K/UL (ref 1.8–7.7)
NEUTROPHILS NFR BLD: 74.6 % (ref 38–73)
NRBC BLD-RTO: 0 /100 WBC
PLATELET # BLD AUTO: 285 K/UL (ref 150–450)
PMV BLD AUTO: 10 FL (ref 9.2–12.9)
POTASSIUM SERPL-SCNC: 4.2 MMOL/L (ref 3.5–5.1)
PROT SERPL-MCNC: 7.5 G/DL (ref 6–8.4)
RBC # BLD AUTO: 4.07 M/UL (ref 4.6–6.2)
SARS-COV-2 AG RESP QL IA.RAPID: NEGATIVE
SODIUM SERPL-SCNC: 136 MMOL/L (ref 136–145)
WBC # BLD AUTO: 8.75 K/UL (ref 3.9–12.7)

## 2022-01-24 PROCEDURE — 71000015 HC POSTOP RECOV 1ST HR: Performed by: SURGERY

## 2022-01-24 PROCEDURE — 25000003 PHARM REV CODE 250: Performed by: SURGERY

## 2022-01-24 PROCEDURE — 36000708 HC OR TIME LEV III 1ST 15 MIN: Performed by: SURGERY

## 2022-01-24 PROCEDURE — 71000044 HC DOSC ROUTINE RECOVERY FIRST HOUR: Performed by: SURGERY

## 2022-01-24 PROCEDURE — 47562 PR LAP,CHOLECYSTECTOMY: ICD-10-PCS | Mod: ,,, | Performed by: SURGERY

## 2022-01-24 PROCEDURE — 63600175 PHARM REV CODE 636 W HCPCS: Performed by: STUDENT IN AN ORGANIZED HEALTH CARE EDUCATION/TRAINING PROGRAM

## 2022-01-24 PROCEDURE — D9220A PRA ANESTHESIA: Mod: ,,, | Performed by: ANESTHESIOLOGY

## 2022-01-24 PROCEDURE — 85025 COMPLETE CBC W/AUTO DIFF WBC: CPT | Performed by: STUDENT IN AN ORGANIZED HEALTH CARE EDUCATION/TRAINING PROGRAM

## 2022-01-24 PROCEDURE — 88304 TISSUE EXAM BY PATHOLOGIST: CPT | Performed by: PATHOLOGY

## 2022-01-24 PROCEDURE — 36000709 HC OR TIME LEV III EA ADD 15 MIN: Performed by: SURGERY

## 2022-01-24 PROCEDURE — 80053 COMPREHEN METABOLIC PANEL: CPT | Performed by: STUDENT IN AN ORGANIZED HEALTH CARE EDUCATION/TRAINING PROGRAM

## 2022-01-24 PROCEDURE — 25000003 PHARM REV CODE 250: Performed by: STUDENT IN AN ORGANIZED HEALTH CARE EDUCATION/TRAINING PROGRAM

## 2022-01-24 PROCEDURE — 88304 TISSUE EXAM BY PATHOLOGIST: CPT | Mod: 26,,, | Performed by: PATHOLOGY

## 2022-01-24 PROCEDURE — 47562 LAPAROSCOPIC CHOLECYSTECTOMY: CPT | Mod: ,,, | Performed by: SURGERY

## 2022-01-24 PROCEDURE — 88304 PR  SURG PATH,LEVEL III: ICD-10-PCS | Mod: 26,,, | Performed by: PATHOLOGY

## 2022-01-24 PROCEDURE — D9220A PRA ANESTHESIA: ICD-10-PCS | Mod: ,,, | Performed by: ANESTHESIOLOGY

## 2022-01-24 PROCEDURE — 27201423 OPTIME MED/SURG SUP & DEVICES STERILE SUPPLY: Performed by: SURGERY

## 2022-01-24 PROCEDURE — 37000009 HC ANESTHESIA EA ADD 15 MINS: Performed by: SURGERY

## 2022-01-24 PROCEDURE — 37000008 HC ANESTHESIA 1ST 15 MINUTES: Performed by: SURGERY

## 2022-01-24 RX ORDER — BUPIVACAINE HYDROCHLORIDE 5 MG/ML
INJECTION, SOLUTION EPIDURAL; INTRACAUDAL
Status: DISCONTINUED | OUTPATIENT
Start: 2022-01-24 | End: 2022-01-24 | Stop reason: HOSPADM

## 2022-01-24 RX ORDER — CEFAZOLIN SODIUM/D5W 2 G/100 ML
2 PLASTIC BAG, INJECTION (ML) INTRAVENOUS
Status: DISCONTINUED | OUTPATIENT
Start: 2022-01-24 | End: 2022-01-24 | Stop reason: HOSPADM

## 2022-01-24 RX ORDER — OXYCODONE AND ACETAMINOPHEN 5; 325 MG/1; MG/1
1 TABLET ORAL ONCE
Status: COMPLETED | OUTPATIENT
Start: 2022-01-24 | End: 2022-01-24

## 2022-01-24 RX ORDER — ROCURONIUM BROMIDE 10 MG/ML
INJECTION, SOLUTION INTRAVENOUS
Status: DISCONTINUED | OUTPATIENT
Start: 2022-01-24 | End: 2022-01-24

## 2022-01-24 RX ORDER — KETAMINE HCL IN 0.9 % NACL 50 MG/5 ML
SYRINGE (ML) INTRAVENOUS
Status: DISCONTINUED | OUTPATIENT
Start: 2022-01-24 | End: 2022-01-24

## 2022-01-24 RX ORDER — CLINDAMYCIN PHOSPHATE 900 MG/50ML
INJECTION, SOLUTION INTRAVENOUS
Status: DISCONTINUED | OUTPATIENT
Start: 2022-01-24 | End: 2022-01-24

## 2022-01-24 RX ORDER — ONDANSETRON 2 MG/ML
INJECTION INTRAMUSCULAR; INTRAVENOUS
Status: DISCONTINUED | OUTPATIENT
Start: 2022-01-24 | End: 2022-01-24

## 2022-01-24 RX ORDER — DEXAMETHASONE SODIUM PHOSPHATE 4 MG/ML
INJECTION, SOLUTION INTRA-ARTICULAR; INTRALESIONAL; INTRAMUSCULAR; INTRAVENOUS; SOFT TISSUE
Status: DISCONTINUED | OUTPATIENT
Start: 2022-01-24 | End: 2022-01-24

## 2022-01-24 RX ORDER — PHENYLEPHRINE HCL IN 0.9% NACL 1 MG/10 ML
SYRINGE (ML) INTRAVENOUS
Status: DISCONTINUED | OUTPATIENT
Start: 2022-01-24 | End: 2022-01-24

## 2022-01-24 RX ORDER — SODIUM CHLORIDE 0.9 % (FLUSH) 0.9 %
10 SYRINGE (ML) INJECTION
Status: DISCONTINUED | OUTPATIENT
Start: 2022-01-24 | End: 2022-01-24 | Stop reason: HOSPADM

## 2022-01-24 RX ORDER — HYDROMORPHONE HYDROCHLORIDE 1 MG/ML
0.2 INJECTION, SOLUTION INTRAMUSCULAR; INTRAVENOUS; SUBCUTANEOUS EVERY 5 MIN PRN
Status: DISCONTINUED | OUTPATIENT
Start: 2022-01-24 | End: 2022-01-24 | Stop reason: HOSPADM

## 2022-01-24 RX ORDER — MIDAZOLAM HYDROCHLORIDE 1 MG/ML
INJECTION, SOLUTION INTRAMUSCULAR; INTRAVENOUS
Status: DISCONTINUED | OUTPATIENT
Start: 2022-01-24 | End: 2022-01-24

## 2022-01-24 RX ORDER — FENTANYL CITRATE 50 UG/ML
INJECTION, SOLUTION INTRAMUSCULAR; INTRAVENOUS
Status: DISCONTINUED | OUTPATIENT
Start: 2022-01-24 | End: 2022-01-24

## 2022-01-24 RX ORDER — OXYCODONE AND ACETAMINOPHEN 5; 325 MG/1; MG/1
1 TABLET ORAL EVERY 4 HOURS PRN
Qty: 15 TABLET | Refills: 0 | Status: SHIPPED | OUTPATIENT
Start: 2022-01-24 | End: 2022-09-06

## 2022-01-24 RX ORDER — PROPOFOL 10 MG/ML
VIAL (ML) INTRAVENOUS
Status: DISCONTINUED | OUTPATIENT
Start: 2022-01-24 | End: 2022-01-24

## 2022-01-24 RX ORDER — LIDOCAINE HYDROCHLORIDE AND EPINEPHRINE 10; 10 MG/ML; UG/ML
INJECTION, SOLUTION INFILTRATION; PERINEURAL
Status: DISCONTINUED | OUTPATIENT
Start: 2022-01-24 | End: 2022-01-24 | Stop reason: HOSPADM

## 2022-01-24 RX ORDER — SODIUM CHLORIDE 9 MG/ML
INJECTION, SOLUTION INTRAVENOUS CONTINUOUS
Status: DISCONTINUED | OUTPATIENT
Start: 2022-01-24 | End: 2022-01-24 | Stop reason: HOSPADM

## 2022-01-24 RX ORDER — LIDOCAINE HYDROCHLORIDE 20 MG/ML
INJECTION, SOLUTION EPIDURAL; INFILTRATION; INTRACAUDAL; PERINEURAL
Status: DISCONTINUED | OUTPATIENT
Start: 2022-01-24 | End: 2022-01-24

## 2022-01-24 RX ADMIN — DEXAMETHASONE SODIUM PHOSPHATE 4 MG: 4 INJECTION INTRA-ARTICULAR; INTRALESIONAL; INTRAMUSCULAR; INTRAVENOUS; SOFT TISSUE at 07:01

## 2022-01-24 RX ADMIN — SODIUM CHLORIDE: 0.9 INJECTION, SOLUTION INTRAVENOUS at 06:01

## 2022-01-24 RX ADMIN — OXYCODONE HYDROCHLORIDE AND ACETAMINOPHEN 1 TABLET: 5; 325 TABLET ORAL at 09:01

## 2022-01-24 RX ADMIN — HYDROMORPHONE HYDROCHLORIDE 0.2 MG: 1 INJECTION, SOLUTION INTRAMUSCULAR; INTRAVENOUS; SUBCUTANEOUS at 09:01

## 2022-01-24 RX ADMIN — ROCURONIUM BROMIDE 20 MG: 10 INJECTION, SOLUTION INTRAVENOUS at 08:01

## 2022-01-24 RX ADMIN — ONDANSETRON 4 MG: 2 INJECTION INTRAMUSCULAR; INTRAVENOUS at 08:01

## 2022-01-24 RX ADMIN — PROPOFOL 150 MG: 10 INJECTION, EMULSION INTRAVENOUS at 07:01

## 2022-01-24 RX ADMIN — FENTANYL CITRATE 50 MCG: 50 INJECTION INTRAMUSCULAR; INTRAVENOUS at 07:01

## 2022-01-24 RX ADMIN — CLINDAMYCIN IN 5 PERCENT DEXTROSE 900 MG: 18 INJECTION, SOLUTION INTRAVENOUS at 07:01

## 2022-01-24 RX ADMIN — Medication 15 MG: at 08:01

## 2022-01-24 RX ADMIN — ROCURONIUM BROMIDE 50 MG: 10 INJECTION, SOLUTION INTRAVENOUS at 07:01

## 2022-01-24 RX ADMIN — MIDAZOLAM 2 MG: 1 INJECTION INTRAMUSCULAR; INTRAVENOUS at 07:01

## 2022-01-24 RX ADMIN — Medication 25 MG: at 07:01

## 2022-01-24 RX ADMIN — PROPOFOL 50 MG: 10 INJECTION, EMULSION INTRAVENOUS at 07:01

## 2022-01-24 RX ADMIN — LIDOCAINE HYDROCHLORIDE 100 MG: 20 INJECTION, SOLUTION EPIDURAL; INFILTRATION; INTRACAUDAL at 07:01

## 2022-01-24 RX ADMIN — Medication 100 MCG: at 07:01

## 2022-01-24 RX ADMIN — GLYCOPYRROLATE 0.2 MG: 0.2 INJECTION INTRAMUSCULAR; INTRAVENOUS at 07:01

## 2022-01-24 RX ADMIN — SUGAMMADEX 400 MG: 100 INJECTION, SOLUTION INTRAVENOUS at 08:01

## 2022-01-24 NOTE — DISCHARGE INSTRUCTIONS
Patient Education       Cholecystectomy Discharge Instructions   About this topic   Cholecystectomy is surgery to remove the gallbladder. There are two types of surgeries done for this. You may have had your gallbladder taken out with a laparoscope. This means you have a few small cuts in your belly. Other times, it is taken out through one large cut in your belly. This is called an open procedure.     What care is needed at home?   · Ask your doctor what you need to do when you go home. Make sure you ask questions if you do not understand what the doctor says. This way you will know what you need to do.  · Talk to your doctor about how to care for your cut site. Ask your doctor about:  ? When you should change your bandages  ? When you may take a bath or shower  ? If you need to be careful with lifting things over 10 pounds (4.5 kg)  ? When you may go back to your normal activities like work, driving, or sex  · Be sure to wash your hands before and after touching your wound or dressing.  · If you had a laparoscopic procedure, you will have a few small cut sites. They may have special tape on them called steri-strips. These will fall off on their own in about 10 days. You can take them off after 10 to 14 days if they have not fallen off. They may also use a skin glue to keep the cut sites together. This will slowly peel off on its own in about 7 to 14 days. Do not pick or peel at them.  · If you had an open procedure, you will have one large cut site. It may have sutures or staples. You may also have a drain to get rid of extra fluid. When the drain is taken out, there will be a small cut site.  · Keep coughing and doing deep breathing exercises for 7 to 10 days after you go home. This will help prevent lung infections.     What follow-up care is needed?   · Your doctor may ask you to make visits to the office to check on your progress. Be sure to keep these visits.  · If you have stitches or staples, you will need  to have them taken out. Your doctor will often want to do this in 1 to 2 weeks. If the doctor used skin glue, the glue will fall off on its own.  · If you have a drain, you will need to have that removed in the office. Your doctor will want to do this in 1 to 2 weeks.  What drugs may be needed?   The doctor may order drugs to:  · Help with pain  · Fight an infection  · Soften stools if you are taking drugs for pain control  · Help with upset stomach  · Prevent blood clots  Will physical activity be limited?   · Try to walk 3 to 4 times each day. Do your best to walk a little farther and longer each day.  · You may need to rest for a while. Talk to your doctor before you run, work out, or play sports.  · Do light household work only, such as washing dishes or helping with meals.  What changes to diet are needed?   · Drink 8 to 10 glasses of fluids each day.  · Avoid eating greasy or spicy foods.  · Your doctor may suggest a high-fiber diet. Ask your doctor if you need to change your diet.  What problems could happen?   · Bleeding  · Infection  · Swelling of the pancreas  · Injury to small bowel  When do I need to call the doctor?   · Signs of infection. These include a fever of 100.4°F (38°C) or higher, chills, very bad sore throat, pain with passing urine, or wound that will not heal.  · Signs of wound infection. These include swelling, redness, warmth around the wound; too much pain when touched; yellowish, greenish, or bloody discharge; foul smell coming from the cut site; cut site opens up.  · Signs of liver problems like upset stomach or throwing up, belly pain, feeling tired, dark urine, yellow skin or eyes, not hungry.  · Bowel movements that are white or gray in color or no bowel movement for 2 to 3 days after surgery  · Sudden onset of chest pain, fast heartbeat, breathing problems, and pain or tenderness in your calf could be a sign that a blood clot has traveled to your lungs. Call for emergency help right  away.  Helpful tips   · When you cough, sneeze, or do deep breathing exercises, press a pillow across your cut to support the muscles to protect the wound and ease pain.  · Be active to help healing and prevent blood clots.  Teach Back: Helping You Understand   The Teach Back Method helps you understand the information we are giving you. After you talk with the staff, tell them in your own words what you learned. This helps to make sure the staff has described each thing clearly. It also helps to explain things that may have been confusing. Before going home, make sure you can do these:  · I can tell you about my procedure.  · I can tell you how to care for my cut site.  · I can tell you what I will do if I have swelling, redness, or warmth around my wound.  Where can I learn more?   FamilyDoctor.org  http://familydoctor.org/familydoctor/en/drugs-procedures-devices/procedures-devices/gallbladder-removal-laparoscopic-method.html   [employer]  https://www.[employer].ca/health-topics/rz778847   [facility]  https://www.nhs.uk/conditions/gallbladder-removal/what-happens/   Last Reviewed Date   2021-05-17  Consumer Information Use and Disclaimer   This information is not specific medical advice and does not replace information you receive from your health care provider. This is only a brief summary of general information. It does NOT include all information about conditions, illnesses, injuries, tests, procedures, treatments, therapies, discharge instructions or life-style choices that may apply to you. You must talk with your health care provider for complete information about your health and treatment options. This information should not be used to decide whether or not to accept your health care providers advice, instructions or recommendations. Only your health care provider has the knowledge and training to provide advice that is right for you.  Copyright   Copyright © 2021 [employer] and its affiliates and/or  licensors. All rights reserved.

## 2022-01-24 NOTE — PLAN OF CARE
Patient tolerated procedure and anesthesia with no complaints of nausea. Discharge material given and explained to patient and spouse. Both verbalized understanding. Patient awaiting ride via wheelchair in stable condition with no complaints.

## 2022-01-24 NOTE — ANESTHESIA PROCEDURE NOTES
Intubation    Date/Time: 1/24/2022 7:19 AM  Performed by: Althea Dawkins MD  Authorized by: Tobin Gilliam MD     Intubation:     Induction:  Intravenous    Intubated:  Postinduction    Mask Ventilation:  Easy mask    Attempts:  1    Attempted By:  Resident anesthesiologist    Method of Intubation:  Direct    Blade:  Bernardo 2    Laryngeal View Grade: Grade I - full view of cords      Difficult Airway Encountered?: No      Complications:  None    Airway Device:  Oral endotracheal tube    Airway Device Size:  7.5    Style/Cuff Inflation:  Cuffed (inflated to minimal occlusive pressure)    Tube secured:  22    Secured at:  The lips    Placement Verified By:  Capnometry    Complicating Factors:  None    Findings Post-Intubation:  BS equal bilateral and atraumatic/condition of teeth unchanged

## 2022-01-24 NOTE — ANESTHESIA PREPROCEDURE EVALUATION
[facility]  Anesthesia Pre-Operative Evaluation         Patient Name: Steve Carranza  YOB: 1960  MRN: [id number]    SUBJECTIVE:     Pre-operative evaluation for Procedure(s) (LRB):  CHOLECYSTECTOMY, LAPAROSCOPIC (N/A)     01/23/2022    Steve Carranza is a 61 y.o. male w/ a significant PMHx of HTN, GERD, right knee arthritis, anxiety and HLD. Hyperkalemia noted on prior labs, repeat labs ordered for morning of sx.    Patient now presents for the above procedure(s).      LDA:        Peripheral IV - Single Lumen 02/01/17 1200 Left Hand (Active)   Number of days: 1817       Prev airway: None documented.    Drips: None documented.      Patient Active Problem List   Diagnosis    Screen for colon cancer    Refractive error    Traumatic arthritis of right knee    Essential hypertension    GERD (gastroesophageal reflux disease)    Hypertensive urgency    Symptomatic posterior vitreous detachment of left eye    Bilateral chronic knee pain    Anxiety    HLD (hyperlipidemia)       Review of patient's allergies indicates:   Allergen Reactions    Penicillins      As a child       Current Inpatient Medications:      No current facility-administered medications on file prior to encounter.     Current Outpatient Medications on File Prior to Encounter   Medication Sig Dispense Refill    amlodipine (NORVASC) 10 MG tablet Take 10 mg by mouth once daily.      metoprolol succinate (TOPROL-XL) 100 MG 24 hr tablet TAKE 1 TABLET(100 MG) BY MOUTH EVERY EVENING 90 tablet 0    meloxicam (MOBIC) 15 MG tablet Take 1 tablet (15 mg total) by mouth once daily. (Patient taking differently: Take 15 mg by mouth daily as needed.) 30 tablet 2       Past Surgical History:   Procedure Laterality Date    COLONOSCOPY  06/2016    KNEE SURGERY      bilateral    REFRACTIVE SURGERY      TONSILLECTOMY         Social History     Socioeconomic History    Marital status:     Number of children: 2    Tobacco Use    Smoking status: Never Smoker    Smokeless tobacco: Never Used   Substance and Sexual Activity    Alcohol use: Yes     Alcohol/week: 6.0 standard drinks     Types: 6 Glasses of wine per week     Comment: every couple of weeks    Drug use: No    Sexual activity: Yes   Other Topics Concern    Patient feels they ought to cut down on drinking/drug use No    Patient annoyed by others criticizing their drinking/drug use No    Patient has felt bad or guilty about drinking/drug use No    Patient has had a drink/used drugs as an eye opener in the AM No       OBJECTIVE:     Vital Signs Range (Last 24H):         Significant Labs:  Lab Results   Component Value Date    WBC 6.37 01/19/2022    HGB 14.0 01/19/2022    HCT 40.9 01/19/2022     01/19/2022    CHOL 206 (H) 04/22/2021    TRIG 75 04/22/2021    HDL 62 04/22/2021    ALT 29 01/19/2022    AST 26 01/19/2022     01/19/2022    K 5.9 (H) 01/19/2022     01/19/2022    CREATININE 0.8 01/19/2022    BUN 16 01/19/2022    CO2 28 01/19/2022    TSH 4.272 (H) 04/22/2021    PSA 0.39 04/22/2021    HGBA1C 4.5 04/22/2021       Diagnostic Studies: No relevant studies.    EKG:   Results for orders placed or performed in visit on 02/01/17   EKG 12-lead    Collection Time: 02/01/17 12:16 PM    Narrative    Test Reason : I35.0  Vent. Rate : 077 BPM     Atrial Rate : 077 BPM     P-R Int : 170 ms          QRS Dur : 102 ms      QT Int : 354 ms       P-R-T Axes : 033 019 027 degrees     QTc Int : 400 ms    Normal sinus rhythm  Normal ECG  When compared with ECG of 31-AUG-2015 14:56,  No significant change was found  Confirmed by North Cartwright MD ([id number]) on 2/14/2017 8:06:22 AM    Referred By: PARTH GUTIERREZ           Confirmed By:North Cartwright MD       2D ECHO:  TTE:  No results found for this or any previous visit.    GAGANDEEP:  No results found for this or any previous visit.    ASSESSMENT/PLAN:                                                                                                               01/23/2022  Steve Carranza is a 61 y.o., male.    Anesthesia Evaluation    I have reviewed the Patient Summary Reports.    I have reviewed the Nursing Notes. I have reviewed the NPO Status.   I have reviewed the Medications.     Review of Systems  Anesthesia Hx:  No problems with previous Anesthesia  Denies Family Hx of Anesthesia complications.   Denies Personal Hx of Anesthesia complications.   Social:  Non-Smoker, Alcohol Use    Hematology/Oncology:  Hematology Normal   Oncology Normal     EENT/Dental:EENT/Dental Normal   Cardiovascular:   Exercise tolerance: good Hypertension    Pulmonary:  Pulmonary Normal    Renal/:  Renal/ Normal     Hepatic/GI:   GERD    Musculoskeletal:  Musculoskeletal Normal    Neurological:  Neurology Normal    Endocrine:  Endocrine Normal    Dermatological:  Skin Normal    Psych:  Psychiatric Normal           Physical Exam  General:  Well nourished    Airway/Jaw/Neck:  Airway Findings: Mouth Opening: Normal Tongue: Normal  General Airway Assessment: Adult  Mallampati: II  Improves to II with phonation.  TM Distance: Normal, at least 6 cm  Jaw/Neck Findings:  Neck ROM: Normal ROM     Eyes/Ears/Nose:  Eyes/Ears/Nose Findings:    Dental:  Dental Findings: In tact   Chest/Lungs:  Chest/Lungs Findings: Clear to auscultation, Normal Respiratory Rate     Heart/Vascular:  Heart Findings: Rate: Normal  Rhythm: Regular Rhythm  Sounds: Normal        Mental Status:  Mental Status Findings:  Cooperative, Alert and Oriented         Anesthesia Plan  Type of Anesthesia, risks & benefits discussed:  Anesthesia Type:  general    Patient's Preference:   Plan Factors:          Intra-op Monitoring Plan: standard ASA monitors  Intra-op Monitoring Plan Comments:   Post Op Pain Control Plan: multimodal analgesia, IV/PO Opioids PRN and per primary service following discharge from PACU  Post Op Pain Control Plan Comments:     Induction:   IV  Beta Blocker:  Patient is on a  Beta-Blocker and has received one dose within the past 24 hours (No further documentation required).       Informed Consent: Patient understands risks and agrees with Anesthesia plan.  Questions answered. Anesthesia consent signed with patient.  ASA Score: 2     Day of Surgery Review of History & Physical:    H&P update referred to the surgeon.         Ready For Surgery From Anesthesia Perspective.

## 2022-01-24 NOTE — TRANSFER OF CARE
"Anesthesia Transfer of Care Note    Patient: Steve Carranza    Procedure(s) Performed: Procedure(s) (LRB):  CHOLECYSTECTOMY, LAPAROSCOPIC (N/A)    Patient location: PACU    Anesthesia Type: general    Transport from OR: Transported from OR on 6-10 L/min O2 by face mask with adequate spontaneous ventilation    Post pain: adequate analgesia    Post assessment: no apparent anesthetic complications    Post vital signs: stable    Level of consciousness: awake    Nausea/Vomiting: no nausea/vomiting    Complications: none    Transfer of care protocol was followed      Last vitals:   Visit Vitals  /70 (BP Location: Left arm, Patient Position: Lying)   Pulse 65   Temp 36.5 °C (97.7 °F) (Temporal)   Resp 16   Ht 5' 9" (1.753 m)   Wt 95.3 kg (210 lb)   SpO2 99%   BMI 31.01 kg/m²     "

## 2022-01-24 NOTE — PATIENT INSTRUCTIONS
No lifting over 10 lbs until clinic visit in 2 weeks. Avoid any heavy lifting, straining or constipation. Can shower and allow water to run over incisions in 48hrs after surgery. Do not soak in a bath or pool for 2 weeks. Take provided medications as prescribed.

## 2022-01-24 NOTE — ANESTHESIA POSTPROCEDURE EVALUATION
Anesthesia Post Evaluation    Patient: Steve Carranza    Procedure(s) Performed: Procedure(s) (LRB):  CHOLECYSTECTOMY, LAPAROSCOPIC (N/A)    Final Anesthesia Type: general      Patient location during evaluation: PACU  Patient participation: Yes- Able to Participate  Level of consciousness: awake  Post-procedure vital signs: reviewed and stable  Pain management: adequate  Airway patency: patent    PONV status at discharge: No PONV  Anesthetic complications: no      Cardiovascular status: blood pressure returned to baseline  Respiratory status: unassisted  Hydration status: euvolemic  Follow-up not needed.          Vitals Value Taken Time   /72 01/24/22 1032   Temp 36.5 °C (97.7 °F) 01/24/22 0846   Pulse 69 01/24/22 1039   Resp 26 01/24/22 1039   SpO2 94 % 01/24/22 1039   Vitals shown include unvalidated device data.      No case tracking events are documented in the log.      Pain/Mai Score: Pain Rating Prior to Med Admin: 5 (1/24/2022  9:54 AM)  Mai Score: 10 (1/24/2022 10:40 AM)

## 2022-01-24 NOTE — BRIEF OP NOTE
[facility] - Surgery ([facility])  Brief Operative Note    Surgery Date: 1/24/2022     Surgeon(s) and Role:     * Woo Lopez Jr., MD - Primary     * Woo Saucedo MD - Resident - Assisting        Pre-op Diagnosis:  Symptomatic cholelithiasis [K80.20]    Post-op Diagnosis:  Post-Op Diagnosis Codes:     * Symptomatic cholelithiasis [K80.20]    Procedure(s) (LRB):  CHOLECYSTECTOMY, LAPAROSCOPIC (N/A)    Anesthesia: General    Operative Findings: Gallbladder removed with minimal blood loss. Large stone noted in neck of gallbladder. No complications encountered during procedure. Healthy appearing skin edges at incisions noted upon completion of the procedure.    Estimated Blood Loss: Minimal    Specimens:   Specimen (24h ago, onward)             Start     Ordered    01/24/22 0738  Specimen to Pathology, Surgery General Surgery  Once        Comments: Pre-op Diagnosis: Symptomatic cholelithiasis [K80.20]    Procedure(s):  CHOLECYSTECTOMY, LAPAROSCOPIC     Number of specimens: 1    Name of specimens: 1. Gallbladder - permanent   References:    Click here for ordering Quick Tip   Question Answer Comment   Procedure Type: General Surgery    Release to patient Immediate        01/24/22 0737                  Discharge Note    OUTCOME: Patient tolerated treatment/procedure well without complication and is now ready for discharge.    DISPOSITION: Home or Self Care    FINAL DIAGNOSIS:  * Symptomatic cholelithiasis [K80.20]    FOLLOWUP: In clinic    DISCHARGE INSTRUCTIONS:    Discharge Procedure Orders   Diet Adult Regular     Notify your health care provider if you experience any of the following:  temperature >100.4     Notify your health care provider if you experience any of the following:  persistent nausea and vomiting or diarrhea     Notify your health care provider if you experience any of the following:  severe uncontrolled pain     Notify your health care provider if you experience any of the following:  redness,  tenderness, or signs of infection (pain, swelling, redness, odor or green/yellow discharge around incision site)     Notify your health care provider if you experience any of the following:  difficulty breathing or increased cough     Notify your health care provider if you experience any of the following:  severe persistent headache     Notify your health care provider if you experience any of the following:  worsening rash     Notify your health care provider if you experience any of the following:  persistent dizziness, light-headedness, or visual disturbances     Notify your health care provider if you experience any of the following:  increased confusion or weakness     No dressing needed     Activity as tolerated       Woo Saucedo MD  General Surgery  [facility]

## 2022-01-24 NOTE — OP NOTE
DATE OF PROCEDURE: 01/24/2022  SERVICE: General Surgery.   Surgeon(s) and Role:     * Woo Lopez Jr., MD - Primary     * Woo Allan MD - Resident - Assisting  PREOPERATIVE DIAGNOSIS: Symptomatic Cholelithiasis  POSTOPERATIVE DIAGNOSIS: Same.   PROCEDURE: Lap leila.   ANESTHESIA: General endotracheal and local.   DESCRIPTION OF PROCEDURE: The patient was taken to the Operating Room, placed   under general anesthesia, prepped and draped in sterile fashion. At this time,   incision was made supraumbilically after infiltrating with local anesthetic.   This was carried down with electrocautery and blunt dissection to the linea   alba. Each side of the linea alba was grasped with Kocher clamp and elevated.   This was opened sharply in the midline and intraabdominal access was obtained.   At this time, an 0 Vicryl suture was placed in the fascia of this incision;   however, it was not tied down, and a 12-mm trocar was placed into the abdominal   cavity. At this time, pneumoperitoneum was obtained. Further ports were then   placed including an epigastric port, a midclavicular subcostal port on the right   and an anterior axillary subcostal port on the right as well. The patient was   then placed in reverse Trendelenburg and tilted to the left. The gallbladder   was grasped and elevated cephalad and laterally. Similarly, the base was   grasped and pulled inferiorly and laterally. At this time, using blunt   instrumentation, the peritoneum around the gallbladder was opened, exposing the   base of the gallbladder. We continued to take down attachments exposing the   cystic artery and cystic duct until the critical view was obtained showing the   artery entering the gallbladder, the duct entering the gallbladder, the base of   the gallbladder with liver behind it. Once these structures were skeletonized   and a critical view was obtained, each were clipped with two clips proximally   and one distally and  these structures were transected. We then took the   gallbladder off the liver bed using electrocautery.  It was then placed in an   EndoCatch bag and passed off the table. The gallbladder was then inspected.   There were no signs of any abnormalities. Any bleeding was stopped with   electrocautery. The area was irrigated copiously. The clips were inspected and   in good position on the cystic artery and duct. The fluid was suctioned.   There was no further bleeding from the liver bed. The ports were then removed   under direct visualization. The suture had been placed in the fascia. The   incision supraumbilically was tied down closing the fascia of this incision and   the skin of all four ports was closed with 4-0 Monocryl suture in subcuticular   fashion. Mastisol and Steri-Strips were placed. The patient was allowed to   awake from general anesthesia and transferred to bed for transfer to Recovery.   COMPLICATIONS: None.   SPONGE COUNT: Correct.   BLOOD LOSS: 15 mL   FLUIDS: Per Anesthesia.   BLOOD GIVEN: None.   DRAINS: None.   SPECIMENS: Gallbladder.   CONDITION OF PATIENT: Good.   I was present for the entire procedure.

## 2022-01-31 LAB
FINAL PATHOLOGIC DIAGNOSIS: NORMAL
GROSS: NORMAL
Lab: NORMAL

## 2022-02-02 ENCOUNTER — TELEPHONE (OUTPATIENT)
Dept: SURGERY | Facility: CLINIC | Age: 62
End: 2022-02-02
Payer: COMMERCIAL

## 2022-02-02 ENCOUNTER — PATIENT MESSAGE (OUTPATIENT)
Dept: SURGERY | Facility: CLINIC | Age: 62
End: 2022-02-02
Payer: COMMERCIAL

## 2022-02-02 NOTE — TELEPHONE ENCOUNTER
Called pt as requested through a portal message.    Pt concerned about the steri strips still being on his incisions.  He states they have an odor, but no redness or drainage.  Advised pt to wash well around incisions sites and pat dry.  If the steri strips were becoming soiled and he would like to remove them, he could gently do so.  Pt understands instructions and feels ok to leave them on for now.  Will michael with any further questions or concerns.

## 2022-02-07 ENCOUNTER — OFFICE VISIT (OUTPATIENT)
Dept: SURGERY | Facility: CLINIC | Age: 62
End: 2022-02-07
Payer: COMMERCIAL

## 2022-02-07 VITALS
HEIGHT: 69 IN | HEART RATE: 62 BPM | SYSTOLIC BLOOD PRESSURE: 132 MMHG | DIASTOLIC BLOOD PRESSURE: 77 MMHG | BODY MASS INDEX: 30.53 KG/M2 | WEIGHT: 206.13 LBS

## 2022-02-07 DIAGNOSIS — Z09 POSTOP CHECK: Primary | ICD-10-CM

## 2022-02-07 PROCEDURE — 99999 PR PBB SHADOW E&M-EST. PATIENT-LVL III: CPT | Mod: PBBFAC,,, | Performed by: SURGERY

## 2022-02-07 PROCEDURE — 3075F PR MOST RECENT SYSTOLIC BLOOD PRESS GE 130-139MM HG: ICD-10-PCS | Mod: CPTII,S$GLB,, | Performed by: SURGERY

## 2022-02-07 PROCEDURE — 99024 POSTOP FOLLOW-UP VISIT: CPT | Mod: S$GLB,,, | Performed by: SURGERY

## 2022-02-07 PROCEDURE — 3075F SYST BP GE 130 - 139MM HG: CPT | Mod: CPTII,S$GLB,, | Performed by: SURGERY

## 2022-02-07 PROCEDURE — 3008F PR BODY MASS INDEX (BMI) DOCUMENTED: ICD-10-PCS | Mod: CPTII,S$GLB,, | Performed by: SURGERY

## 2022-02-07 PROCEDURE — 1160F RVW MEDS BY RX/DR IN RCRD: CPT | Mod: CPTII,S$GLB,, | Performed by: SURGERY

## 2022-02-07 PROCEDURE — 1160F PR REVIEW ALL MEDS BY PRESCRIBER/CLIN PHARMACIST DOCUMENTED: ICD-10-PCS | Mod: CPTII,S$GLB,, | Performed by: SURGERY

## 2022-02-07 PROCEDURE — 1159F PR MEDICATION LIST DOCUMENTED IN MEDICAL RECORD: ICD-10-PCS | Mod: CPTII,S$GLB,, | Performed by: SURGERY

## 2022-02-07 PROCEDURE — 1159F MED LIST DOCD IN RCRD: CPT | Mod: CPTII,S$GLB,, | Performed by: SURGERY

## 2022-02-07 PROCEDURE — 99024 PR POST-OP FOLLOW-UP VISIT: ICD-10-PCS | Mod: S$GLB,,, | Performed by: SURGERY

## 2022-02-07 PROCEDURE — 3078F PR MOST RECENT DIASTOLIC BLOOD PRESSURE < 80 MM HG: ICD-10-PCS | Mod: CPTII,S$GLB,, | Performed by: SURGERY

## 2022-02-07 PROCEDURE — 3008F BODY MASS INDEX DOCD: CPT | Mod: CPTII,S$GLB,, | Performed by: SURGERY

## 2022-02-07 PROCEDURE — 99999 PR PBB SHADOW E&M-EST. PATIENT-LVL III: ICD-10-PCS | Mod: PBBFAC,,, | Performed by: SURGERY

## 2022-02-07 PROCEDURE — 3078F DIAST BP <80 MM HG: CPT | Mod: CPTII,S$GLB,, | Performed by: SURGERY

## 2022-02-07 NOTE — PROGRESS NOTES
HPI:  The patient is status post-lap leila.  Doing well.  No sig complaints.  Pain resolving.    PHYSICAL EXAM:    In NAD  Incisions c/d/i    GALLBLADDER, CHOLECYSTECTOMY:   - Acute and chronic cholecystitis with reactive (pseudopyloric) metaplasia   - Cholelithiasis   - No evidence of dysplasia or malignancy     ASSESSMENT:    The patient is doing well after surgery.     PLAN:    Follow up PRN.  Activity: No heavy lifting for 4 weeks.        Woo Lopez MD

## 2022-03-05 RX ORDER — CLONAZEPAM 0.5 MG/1
0.5 TABLET ORAL 3 TIMES DAILY PRN
Qty: 90 TABLET | Refills: 0 | Status: SHIPPED | OUTPATIENT
Start: 2022-03-05 | End: 2022-08-31 | Stop reason: SDUPTHER

## 2022-05-05 DIAGNOSIS — Z12.11 SCREEN FOR COLON CANCER: Primary | ICD-10-CM

## 2022-05-09 ENCOUNTER — TELEPHONE (OUTPATIENT)
Dept: ENDOSCOPY | Facility: HOSPITAL | Age: 62
End: 2022-05-09
Payer: COMMERCIAL

## 2022-05-10 ENCOUNTER — TELEPHONE (OUTPATIENT)
Dept: ENDOSCOPY | Facility: HOSPITAL | Age: 62
End: 2022-05-10
Payer: COMMERCIAL

## 2022-06-01 ENCOUNTER — IMMUNIZATION (OUTPATIENT)
Dept: PRIMARY CARE CLINIC | Facility: CLINIC | Age: 62
End: 2022-06-01
Payer: COMMERCIAL

## 2022-06-01 DIAGNOSIS — Z23 NEED FOR VACCINATION: Primary | ICD-10-CM

## 2022-06-01 PROCEDURE — 91300 COVID-19, MRNA, LNP-S, PF, 30 MCG/0.3 ML DOSE VACCINE: CPT | Mod: PBBFAC | Performed by: INTERNAL MEDICINE

## 2022-08-04 DIAGNOSIS — M54.50 CHRONIC LEFT-SIDED LOW BACK PAIN WITHOUT SCIATICA: ICD-10-CM

## 2022-08-04 DIAGNOSIS — G89.29 CHRONIC LEFT-SIDED LOW BACK PAIN WITHOUT SCIATICA: ICD-10-CM

## 2022-08-04 RX ORDER — MELOXICAM 15 MG/1
15 TABLET ORAL DAILY
Qty: 30 TABLET | Refills: 2 | Status: SHIPPED | OUTPATIENT
Start: 2022-08-04

## 2022-08-31 ENCOUNTER — OFFICE VISIT (OUTPATIENT)
Dept: PSYCHIATRY | Facility: CLINIC | Age: 62
End: 2022-08-31
Payer: COMMERCIAL

## 2022-08-31 DIAGNOSIS — F41.1 GAD (GENERALIZED ANXIETY DISORDER): Primary | ICD-10-CM

## 2022-08-31 PROCEDURE — 99214 PR OFFICE/OUTPT VISIT, EST, LEVL IV, 30-39 MIN: ICD-10-PCS | Mod: S$GLB,,, | Performed by: PSYCHIATRY & NEUROLOGY

## 2022-08-31 PROCEDURE — 99999 PR PBB SHADOW E&M-EST. PATIENT-LVL I: ICD-10-PCS | Mod: PBBFAC,,, | Performed by: PSYCHIATRY & NEUROLOGY

## 2022-08-31 PROCEDURE — 99214 OFFICE O/P EST MOD 30 MIN: CPT | Mod: S$GLB,,, | Performed by: PSYCHIATRY & NEUROLOGY

## 2022-08-31 PROCEDURE — 90833 PSYTX W PT W E/M 30 MIN: CPT | Mod: S$GLB,,, | Performed by: PSYCHIATRY & NEUROLOGY

## 2022-08-31 PROCEDURE — 90833 PR PSYCHOTHERAPY W/PATIENT W/E&M, 30 MIN (ADD ON): ICD-10-PCS | Mod: S$GLB,,, | Performed by: PSYCHIATRY & NEUROLOGY

## 2022-08-31 PROCEDURE — 99999 PR PBB SHADOW E&M-EST. PATIENT-LVL I: CPT | Mod: PBBFAC,,, | Performed by: PSYCHIATRY & NEUROLOGY

## 2022-08-31 RX ORDER — ESCITALOPRAM OXALATE 10 MG/1
10 TABLET ORAL DAILY
Qty: 30 TABLET | Refills: 2 | Status: SHIPPED | OUTPATIENT
Start: 2022-08-31 | End: 2023-08-31

## 2022-08-31 RX ORDER — CLONAZEPAM 0.5 MG/1
0.5 TABLET ORAL 3 TIMES DAILY PRN
Qty: 90 TABLET | Refills: 3 | Status: SHIPPED | OUTPATIENT
Start: 2022-08-31

## 2022-08-31 NOTE — PROGRESS NOTES
Outpatient Psychiatry Follow-Up Visit (MD/NP)    2022    Clinical Status of Patient:  Outpatient (Ambulatory)    Chief Complaint:  Steve Carranza is a 61 y.o. male who presents today for follow-up of anxiety.  Met with patient.  SR MCCLURE of [facility] for [place] [facility] Interval History and Content of Current Session:  Interim Events/Subjective Report/Content of Current Session:     Pt seen initially in 2017 .   History of Present Illness: Anxiety  Patient is here for follow up  of anxiety.  He presented initially in 2017 with  the following anxiety symptoms: chest pain, difficulty concentrating, dizziness, fatigue, insomnia, irritable, palpitations, paresthesias, psychomotor agitation, racing thoughts, sweating. Onset of symptoms was approximately in .  Symptoms have been improving  since  2017 . He denies current suicidal and homicidal ideation. Family history significant for no psychiatric illness.Possible organic causes contributing are: none. Risk factors: none Previous treatment includes medication Xanax.   He complains of the following medication side effects: none.     GEO ; does worry excessively about his kids ; health and mortality muscle tension , irritably ; fatigue - these have improved      Dad  of alc liver dz at 50; mom at 82      2 days prior to our initial meeting in 2107 , he  had am mtg here then felt  odd with nausea and tension ;  / 100 ; responded to 0.25 mg xanax then again soon after  did well.   Klonopin prn event or insomnia; twice monthly         Updated Hx 21   Last seen 2020 post covid recovery   Fully vax'd   He and Shanae have resumed seeing each other daily now  post divorce ;- vac togethered , went to wedding in [place]   Kids are good   Clonazepam 0.5 mg tab tid prn filled 21 #90     22   Late cancel     Updated hx 22 in office   Dtr , Mirna, at [facility] did not match into sorority - feels for her   Son, Ervin at [employer] 9[employer] , baseball and golf -  quit basketball   Cesar and Shanae are closer and spend vacations with  kids and some nights together       Current Outpatient Medications   Medication Instructions    amLODIPine (NORVASC) 10 mg, Daily    clonazePAM (KLONOPIN) 0.5 mg, Oral, 3 times daily PRN    meloxicam (MOBIC) 15 mg, Oral, Daily    metoprolol succinate (TOPROL-XL) 100 MG 24 hr tablet TAKE 1 TABLET(100 MG) BY MOUTH EVERY EVENING    oxyCODONE-acetaminophen (PERCOCET) 5-325 mg per tablet 1 tablet, Oral, Every 4 hours PRN          Psychiatric Review Of Systems - Is patient experiencing or having changes in:  sleep: good - half cup coffee daily   Appetite : good   weight: no , stable   energy/anergy: no  interest/pleasure/anhedonia:  Good ; exercises often   somatic symptoms: no  libido: no  anxiety/panic: no panic , anxiety controlled with clonazepam   guilty/hopelessness: feels well   concentration:  some difficulty with casual reading   S.I.B.s/risky behavior: no  Irritability: yes but responds to clonazepam   Racing thoughts: yes , worried thoughts realistic concerns   Impulsive behaviors: no  Paranoia: no  AVH: no    GEO :  Worry controlled   Restlessness : no   ez fatigue :no   Concentration : see above   Sleeping well   Irritability see above  muscle tension good             Ervin starts [employer] ; ADHD; pt wonders about future of ADHD s/s  But feels does well on meds  ; 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 The patient's  progress toward treatment goals is excellent.   Duration of intervention: 11 minutes.    Review of Systems   Prob Pert. 1 sys, Ext. psych +2 add., Comp. 10-14 sys  PSYCHIATRIC: Pertinant items are noted in the narrative.  CONSTITUTIONAL: No weight gain or loss.   MUSCULOSKELETAL some  pain with new exercize   NEUROLOGIC: No weakness, sensory changes, seizures, confusion, memory loss, tremor or other abnormal movements.  ENDOCRINE: No polydipsia or polyuria.  INTEGUMENTARY: No rashes or lacerations.  EYES: No exophthalmos, jaundice or blindness.  ENT: No dizziness, tinnitus or hearing loss.  RESPIRATORY: No shortness of breath.  CARDIOVASCULAR: No tachycardia or chest pain.  GASTROINTESTINAL: No nausea, vomiting, pain, constipation or diarrhea.  GENITOURINARY: No frequency, dysuria or sexual dysfunction.  HEMATOLOGIC/LYMPHATIC: No excessive bleeding, prolonged or excessive bleeding after dental extraction/injury.  ALLERGIC/IMMUNOLOGIC: No allergic response to materials, foods or animals at this time.    Past Medical, Family and Social History: The patient's past medical, family and social history have been reviewed and updated as appropriate within the electronic medical record - see encounter notes.    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  > [employer] ; ? Focus  Sees Dr Rodríguez  - mild ADD dx        2002 to  to 6yr younger wife , Shanae, who is an expert  out of [employer] and other various locations   First wife , her 2nd .  2001  about 2020. Lots of time with kids . No time alone with each other .     House in [place]    Compliance: yes    Side effects: None    Risk Parameters:  Patient reports no suicidal ideation  Patient reports no homicidal ideation  Patient reports no self-injurious  behavior  Patient reports no violent behavior    Exam (detailed: at least 9 elements; comprehensive: all 15 elements)   Constitutional  Vitals:  Most recent vital signs, dated less than 90 days prior to this appointment, were reviewed.   There were no vitals filed for this visit.     General:  unremarkable, age appropriate, normal weight, casually dressed, neatly groomed     Musculoskeletal  Muscle Strength/Tone:  no spasicity, no rigidity, no flaccidity   Gait & Station:  non-ataxic     Psychiatric  Speech:  no latency; no press   Mood & Affect:  euthymic  congruent and appropriate   Thought Process:  normal and logical   Associations:  intact   Thought Content:  normal, no suicidality, no homicidality, delusions, or paranoia   Insight:  has awareness of illness   Judgement: behavior is adequate to circumstances   Orientation:  grossly intact   Memory: intact for content of interview   Language: grossly intact   Attention Span & Concentration:  able to focus   Fund of Knowledge:  intact and appropriate to age and level of education     Assessment and Diagnosis   Status/Progress: Based on the examination today, the patient's problem(s) is/are improved.  New problems have not been presented today.   Co-morbidities are not complicating management of the primary condition.  There are no active rule-out diagnoses for this patient at this time.     General Impression: stable     No diagnosis found.      Intervention/Counseling/Treatment Plan   Medication Management: Continue current medications.  Refill clonazepam      The risks and benefits of medication were discussed with the patient.  Counseling provided with patient as follows: importance of compliance with chosen treatment options was emphasized, risks and benefits of treatment options, including medications, were discussed with the patient         Return to Clinic:  1 year ; has my cell to call prn

## 2022-08-31 NOTE — PROGRESS NOTES
Outpatient Psychiatry Follow-Up Visit (MD/NP)    8/31/2022    Clinical Status of Patient:  Outpatient (Ambulatory)    Chief Complaint:  Steve Carranza is a 61 y.o. male who presents today for follow-up of anxiety.  Met with patient.  SR MCCLURE of [facility] for [place] [facility]       The patient location is: office in [facility]   The chief complaint leading to consultation is: anxiety     Visit type: audiovisual    Face to Face time with patient: 30 mins   35  minutes of total time spent on the encounter, which includes face to face time and non-face to face time preparing to see the patient (eg, review of tests), Obtaining and/or reviewing separately obtained history, Documenting clinical information in the electronic or other health record, Independently interpreting results (not separately reported) and communicating results to the patient/family/caregiver, or Care coordination (not separately reported).         Each patient to whom he or she provides medical services by telemedicine is:  (1) informed of the relationship between the physician and patient and the respective role of any other health care provider with respect to management of the patient; and (2) notified that he or she may decline to receive medical services by telemedicine and may withdraw from such care at any time.    Notes:     Interval History and Content of Current Session:  Interim Events/Subjective Report/Content of Current Session:     Pt seen initially in 2017 .   History of Present Illness: Anxiety  Patient is here for follow up  of anxiety.  He presented initially in 2017 with  the following anxiety symptoms: chest pain, difficulty concentrating, dizziness, fatigue, insomnia, irritable, palpitations, paresthesias, psychomotor agitation, racing thoughts, sweating. Onset of symptoms was approximately in 2006.  Symptoms have been improving  since  2017 . He denies current suicidal and homicidal ideation. Family history significant for no  "psychiatric illness.Possible organic causes contributing are: none. Risk factors: none Previous treatment includes medication Xanax.   He complains of the following medication side effects: none.     GEO ; does worry excessively about his kids ; health and mortality muscle tension , irritably ; fatigue - these have improved      Dad  of alc liver dz at 50; mom at 82      2 days prior to our initial meeting in 2107 , he  had am mtg here then felt  odd with nausea and tension ;  / 100 ; responded to 0.25 mg xanax then again soon after  did well.   Klonopin prn event or insomnia; twice monthly         Updated Hx 21   Last seen 2020 post covid recovery   Fully vax'd   He and Shanae have resumed seeing each other daily now  post divorce ;- vac togethered , went to wedding in [place]   Kids are good   Clonazepam 0.5 mg tab tid prn filled 21 #90     Updated hx 22  Dtr at [facility] did not match into sorority - feels for her   Dad  at 50 ETOH as did fa in law   Mom former smoker , alive with amputation  by 73  now 83         Psychiatric Review Of Systems - Is patient experiencing or having changes in:  sleep: good - half cup coffee daily   Appetite : good   weight: no , stable - 205  5 "9.5   energy/anergy: not baseline , early start at 0500 tired by noon   interest/pleasure/anhedonia:  Good ; exercises  less often - walks 3 miles but not fast - has gym at home   somatic symptoms: no  libido: no  anxiety/panic: no panic , anxiety controlled with clonazepam   guilty/hopelessness:  hard on self   concentration:  baseline not great ever   S.I.B.s/risky behavior: no  Irritability: yes , worse , but responds to clonazepam   Racing thoughts: yes , still worried thoughts realistic concerns   Impulsive behaviors: no  Paranoia: no  AVH: no    GEO :  Worry  not controlled   Restlessness : no   ez fatigue :yes   Concentration : see above   Sleeping well   Irritability see above  muscle tension  present      "      Ervin degroot ; ADHD; pt wonders about future of ADHD s/s  But feels does well on meds  ; was on natl team for baseball in past   Mirna -former  capt of [employer]       Psychotherapy:  Target symptoms: anxiety , work stress  Why chosen therapy is appropriate versus another modality: relevant to diagnosis, patient responds to this modality, evidence based practice  Outcome monitoring methods: self-report, observation  Therapeutic intervention type: supportive psychotherapy  Topics discussed/themes: work stress, stress related to medical comorbidities, symptom recognition  The patient's response to the intervention is accepting. The patient's progress toward treatment goals is excellent.   Duration of intervention: 11 minutes.    Review of Systems   Prob Pert. 1 sys, Ext. psych +2 add., Comp. 10-14 sys  PSYCHIATRIC: Pertinant items are noted in the narrative.  CONSTITUTIONAL: No weight gain or loss.   MUSCULOSKELETAL some  pain with new exercize   NEUROLOGIC: No weakness, sensory changes, seizures, confusion, memory loss, tremor or other abnormal movements.  ENDOCRINE: No polydipsia or polyuria.  INTEGUMENTARY: No rashes or lacerations.  EYES: No exophthalmos, jaundice or blindness.  ENT: No dizziness, tinnitus or hearing loss.  RESPIRATORY: No shortness of breath.  CARDIOVASCULAR: No tachycardia or chest pain.  GASTROINTESTINAL: No nausea, vomiting, pain, constipation or diarrhea.  GENITOURINARY: No frequency, dysuria or sexual dysfunction.  HEMATOLOGIC/LYMPHATIC: No excessive bleeding, prolonged or excessive bleeding after dental extraction/injury.  ALLERGIC/IMMUNOLOGIC: No allergic response to materials, foods or animals at this time.    Past Medical, Family and Social History: The patient's past medical, family and social history have been reviewed and updated as appropriate within the electronic medical record - see encounter notes.    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 Focus  Sees Dr Rodríguez  - prakash ADD dx        2002 to  to 6yr younger wife , Shanae, who is an expert physicval  out of [employer] and other various locations   First wife , her 2nd .  2001. Lots of time with kids . No time alone with each other .     House in [place]    Compliance: yes    Side effects: None    Risk Parameters:  Patient reports no suicidal ideation  Patient reports no homicidal ideation  Patient reports no self-injurious behavior  Patient reports no violent behavior    Exam (detailed: at least 9 elements; comprehensive: all 15 elements)   Constitutional  Vitals:  Most recent vital signs, dated less than 90 days prior to this appointment, were reviewed.   There were no vitals filed for this visit.     General:  unremarkable, age appropriate, normal weight, casually dressed, neatly groomed     Musculoskeletal  Muscle Strength/Tone:  no spasicity, no rigidity, no flaccidity   Gait & Station:  non-ataxic     Psychiatric  Speech:  no latency; no press   Mood & Affect:  euthymic  congruent and appropriate   Thought Process:  normal and logical   Associations:  intact   Thought Content:  normal, no suicidality, no homicidality, delusions, or paranoia   Insight:  has awareness of illness   Judgement: behavior is adequate to circumstances   Orientation:  grossly intact   Memory: intact for content of interview   Language: grossly intact   Attention Span & Concentration:  able to focus   Fund of Knowledge:  intact and appropriate to age and level of education     Assessment and Diagnosis   Status/Progress: Based on the examination today, the patient's problem(s) is/are improved.  New problems have not been presented today.   Co-morbidities are not complicating management of the primary condition.  There are no active rule-out  diagnoses for this patient at this time.     General Impression: stable       ICD-10-CM ICD-9-CM   1. GEO (generalized anxiety disorder)  F41.1 300.02         Intervention/Counseling/Treatment Plan   Medication Management: Continue current medications.  Refill clonazepam      The risks and benefits of medication were discussed with the patient.  Counseling provided with patient as follows: importance of compliance with chosen treatment options was emphasized, risks and benefits of treatment options, including medications, were discussed with the patient         Return to Clinic:  1 year ; has my cell to call prn

## 2022-09-07 ENCOUNTER — TELEPHONE (OUTPATIENT)
Dept: ENDOSCOPY | Facility: HOSPITAL | Age: 62
End: 2022-09-07
Payer: COMMERCIAL

## 2022-10-11 ENCOUNTER — TELEPHONE (OUTPATIENT)
Dept: ENDOSCOPY | Facility: HOSPITAL | Age: 62
End: 2022-10-11
Payer: COMMERCIAL

## 2022-10-11 VITALS — BODY MASS INDEX: 28.63 KG/M2 | HEIGHT: 70 IN | WEIGHT: 200 LBS

## 2022-10-11 RX ORDER — POLYETHYLENE GLYCOL 3350, SODIUM SULFATE ANHYDROUS, SODIUM BICARBONATE, SODIUM CHLORIDE, POTASSIUM CHLORIDE 236; 22.74; 6.74; 5.86; 2.97 G/4L; G/4L; G/4L; G/4L; G/4L
4 POWDER, FOR SOLUTION ORAL ONCE
Qty: 4000 ML | Refills: 0 | Status: SHIPPED | OUTPATIENT
Start: 2022-10-11 | End: 2022-10-11

## 2022-10-19 ENCOUNTER — HOSPITAL ENCOUNTER (OUTPATIENT)
Facility: HOSPITAL | Age: 62
Discharge: HOME | End: 2022-10-19
Attending: STUDENT IN AN ORGANIZED HEALTH CARE EDUCATION/TRAINING PROGRAM | Admitting: STUDENT IN AN ORGANIZED HEALTH CARE EDUCATION/TRAINING PROGRAM
Payer: COMMERCIAL

## 2022-10-19 ENCOUNTER — ANESTHESIA EVENT (OUTPATIENT)
Dept: ENDOSCOPY | Facility: HOSPITAL | Age: 62
End: 2022-10-19
Payer: COMMERCIAL

## 2022-10-19 ENCOUNTER — ANESTHESIA (OUTPATIENT)
Dept: ENDOSCOPY | Facility: HOSPITAL | Age: 62
End: 2022-10-19
Payer: COMMERCIAL

## 2022-10-19 VITALS
TEMPERATURE: 98 F | RESPIRATION RATE: 16 BRPM | SYSTOLIC BLOOD PRESSURE: 126 MMHG | OXYGEN SATURATION: 96 % | DIASTOLIC BLOOD PRESSURE: 81 MMHG | HEIGHT: 69 IN | BODY MASS INDEX: 29.62 KG/M2 | HEART RATE: 65 BPM | WEIGHT: 200 LBS

## 2022-10-19 DIAGNOSIS — Z12.11 SCREEN FOR COLON CANCER: Primary | ICD-10-CM

## 2022-10-19 PROCEDURE — 27201012 HC FORCEPS, HOT/COLD, DISP: Performed by: STUDENT IN AN ORGANIZED HEALTH CARE EDUCATION/TRAINING PROGRAM

## 2022-10-19 PROCEDURE — 45385 COLONOSCOPY W/LESION REMOVAL: CPT | Mod: PT | Performed by: STUDENT IN AN ORGANIZED HEALTH CARE EDUCATION/TRAINING PROGRAM

## 2022-10-19 PROCEDURE — 88305 TISSUE EXAM BY PATHOLOGIST: CPT | Mod: 26,,, | Performed by: PATHOLOGY

## 2022-10-19 PROCEDURE — 45381 PR COLONOSCPY,FLEX,W/DIR SUBMUC INJECT: ICD-10-PCS | Mod: 33,,, | Performed by: STUDENT IN AN ORGANIZED HEALTH CARE EDUCATION/TRAINING PROGRAM

## 2022-10-19 PROCEDURE — 27201089 HC SNARE, DISP (ANY): Performed by: STUDENT IN AN ORGANIZED HEALTH CARE EDUCATION/TRAINING PROGRAM

## 2022-10-19 PROCEDURE — 37000008 HC ANESTHESIA 1ST 15 MINUTES: Performed by: STUDENT IN AN ORGANIZED HEALTH CARE EDUCATION/TRAINING PROGRAM

## 2022-10-19 PROCEDURE — 25000003 PHARM REV CODE 250: Performed by: NURSE ANESTHETIST, CERTIFIED REGISTERED

## 2022-10-19 PROCEDURE — E9220 PRA ENDO ANESTHESIA: ICD-10-PCS | Mod: 33,,, | Performed by: NURSE ANESTHETIST, CERTIFIED REGISTERED

## 2022-10-19 PROCEDURE — 45380 PR COLONOSCOPY,BIOPSY: ICD-10-PCS | Mod: 59,33,, | Performed by: STUDENT IN AN ORGANIZED HEALTH CARE EDUCATION/TRAINING PROGRAM

## 2022-10-19 PROCEDURE — 45385 COLONOSCOPY W/LESION REMOVAL: CPT | Mod: 33,,, | Performed by: STUDENT IN AN ORGANIZED HEALTH CARE EDUCATION/TRAINING PROGRAM

## 2022-10-19 PROCEDURE — 88305 TISSUE EXAM BY PATHOLOGIST: CPT | Performed by: PATHOLOGY

## 2022-10-19 PROCEDURE — E9220 PRA ENDO ANESTHESIA: HCPCS | Mod: 33,,, | Performed by: NURSE ANESTHETIST, CERTIFIED REGISTERED

## 2022-10-19 PROCEDURE — 88305 TISSUE EXAM BY PATHOLOGIST: ICD-10-PCS | Mod: 26,,, | Performed by: PATHOLOGY

## 2022-10-19 PROCEDURE — 45380 COLONOSCOPY AND BIOPSY: CPT | Mod: 59,33,, | Performed by: STUDENT IN AN ORGANIZED HEALTH CARE EDUCATION/TRAINING PROGRAM

## 2022-10-19 PROCEDURE — 45381 COLONOSCOPY SUBMUCOUS NJX: CPT | Mod: PT | Performed by: STUDENT IN AN ORGANIZED HEALTH CARE EDUCATION/TRAINING PROGRAM

## 2022-10-19 PROCEDURE — 45380 COLONOSCOPY AND BIOPSY: CPT | Mod: 59,PT | Performed by: STUDENT IN AN ORGANIZED HEALTH CARE EDUCATION/TRAINING PROGRAM

## 2022-10-19 PROCEDURE — 27201028 HC NEEDLE, SCLERO: Performed by: STUDENT IN AN ORGANIZED HEALTH CARE EDUCATION/TRAINING PROGRAM

## 2022-10-19 PROCEDURE — 63600175 PHARM REV CODE 636 W HCPCS: Performed by: NURSE ANESTHETIST, CERTIFIED REGISTERED

## 2022-10-19 PROCEDURE — 45381 COLONOSCOPY SUBMUCOUS NJX: CPT | Mod: 33,,, | Performed by: STUDENT IN AN ORGANIZED HEALTH CARE EDUCATION/TRAINING PROGRAM

## 2022-10-19 PROCEDURE — 37000009 HC ANESTHESIA EA ADD 15 MINS: Performed by: STUDENT IN AN ORGANIZED HEALTH CARE EDUCATION/TRAINING PROGRAM

## 2022-10-19 PROCEDURE — 45385 PR COLONOSCOPY,REMV LESN,SNARE: ICD-10-PCS | Mod: 33,,, | Performed by: STUDENT IN AN ORGANIZED HEALTH CARE EDUCATION/TRAINING PROGRAM

## 2022-10-19 RX ORDER — PROPOFOL 10 MG/ML
VIAL (ML) INTRAVENOUS CONTINUOUS PRN
Status: DISCONTINUED | OUTPATIENT
Start: 2022-10-19 | End: 2022-10-19

## 2022-10-19 RX ORDER — SODIUM CHLORIDE 9 MG/ML
INJECTION, SOLUTION INTRAVENOUS CONTINUOUS
Status: DISCONTINUED | OUTPATIENT
Start: 2022-10-19 | End: 2022-10-19 | Stop reason: HOSPADM

## 2022-10-19 RX ORDER — LIDOCAINE HYDROCHLORIDE 20 MG/ML
INJECTION INTRAVENOUS
Status: DISCONTINUED | OUTPATIENT
Start: 2022-10-19 | End: 2022-10-19

## 2022-10-19 RX ORDER — PROPOFOL 10 MG/ML
VIAL (ML) INTRAVENOUS
Status: DISCONTINUED | OUTPATIENT
Start: 2022-10-19 | End: 2022-10-19

## 2022-10-19 RX ADMIN — Medication 170 MCG/KG/MIN: at 08:10

## 2022-10-19 RX ADMIN — PROPOFOL 50 MG: 10 INJECTION, EMULSION INTRAVENOUS at 08:10

## 2022-10-19 RX ADMIN — LIDOCAINE HYDROCHLORIDE 100 MG: 20 INJECTION INTRAVENOUS at 08:10

## 2022-10-19 RX ADMIN — SODIUM CHLORIDE: 0.9 INJECTION, SOLUTION INTRAVENOUS at 08:10

## 2022-10-19 RX ADMIN — PROPOFOL 100 MG: 10 INJECTION, EMULSION INTRAVENOUS at 08:10

## 2022-10-19 RX ADMIN — PROPOFOL 70 MG: 10 INJECTION, EMULSION INTRAVENOUS at 08:10

## 2022-10-19 NOTE — ANESTHESIA PREPROCEDURE EVALUATION
[facility]  Anesthesia Pre-Operative Evaluation         Patient Name: Steve Carranza  YOB: 1960  MRN: [id number]        Patient Active Problem List   Diagnosis    Screen for colon cancer    Refractive error    Traumatic arthritis of right knee    Essential hypertension    GERD (gastroesophageal reflux disease)    Hypertensive urgency    Symptomatic posterior vitreous detachment of left eye    Bilateral chronic knee pain    Anxiety    HLD (hyperlipidemia)       Review of patient's allergies indicates:   Allergen Reactions    Penicillins      As a child       Current Inpatient Medications:      Current Facility-Administered Medications on File Prior to Visit   Medication Dose Route Frequency Provider Last Rate Last Admin    0.9%  NaCl infusion   Intravenous Continuous Radames Cerna MD        0.9%  NaCl infusion   Intravenous Continuous Radames Cerna MD         Current Outpatient Medications on File Prior to Visit   Medication Sig Dispense Refill    amlodipine (NORVASC) 10 MG tablet Take 10 mg by mouth once daily.      clonazePAM (KLONOPIN) 0.5 MG tablet Take 1 tablet (0.5 mg total) by mouth 3 (three) times daily as needed for Anxiety. 90 tablet 3    EScitalopram oxalate (LEXAPRO) 10 MG tablet Take 1 tablet (10 mg total) by mouth once daily. 30 tablet 2    meloxicam (MOBIC) 15 MG tablet Take 1 tablet (15 mg total) by mouth once daily. 30 tablet 2    metoprolol succinate (TOPROL-XL) 100 MG 24 hr tablet TAKE 1 TABLET(100 MG) BY MOUTH EVERY EVENING 90 tablet 0       Past Surgical History:   Procedure Laterality Date    COLONOSCOPY  06/2016    KNEE SURGERY      bilateral    LAPAROSCOPIC CHOLECYSTECTOMY N/A 1/24/2022    Procedure: CHOLECYSTECTOMY, LAPAROSCOPIC;  Surgeon: Woo Lopez Jr., MD;  Location: [facility] OR [address] [place] [address];  Service: General;  Laterality: N/A;  consent in am    REFRACTIVE SURGERY      TONSILLECTOMY         Social History      Socioeconomic History    Marital status:     Number of children: 2   Tobacco Use    Smoking status: Never    Smokeless tobacco: Never   Substance and Sexual Activity    Alcohol use: Yes     Alcohol/week: 6.0 standard drinks     Types: 6 Glasses of wine per week     Comment: every couple of weeks    Drug use: No    Sexual activity: Yes   Other Topics Concern    Patient feels they ought to cut down on drinking/drug use No    Patient annoyed by others criticizing their drinking/drug use No    Patient has felt bad or guilty about drinking/drug use No    Patient has had a drink/used drugs as an eye opener in the AM No       OBJECTIVE:     Vital Signs Range (Last 24H):  Temp:  [37.1 °C (98.8 °F)]   Pulse:  [83]   Resp:  [18]   BP: (159)/(86)   SpO2:  [100 %]       Significant Labs:  Lab Results   Component Value Date    WBC 8.75 01/24/2022    HGB 12.9 (L) 01/24/2022    HCT 36.7 (L) 01/24/2022     01/24/2022    CHOL 206 (H) 04/22/2021    TRIG 75 04/22/2021    HDL 62 04/22/2021    ALT 24 01/24/2022    AST 29 01/24/2022     01/24/2022    K 4.2 01/24/2022     01/24/2022    CREATININE 0.9 01/24/2022    BUN 12 01/24/2022    CO2 21 (L) 01/24/2022    TSH 4.272 (H) 04/22/2021    PSA 0.39 04/22/2021    HGBA1C 4.5 04/22/2021       Diagnostic Studies: No relevant studies.    EKG:   Results for orders placed or performed in visit on 02/01/17   EKG 12-lead    Collection Time: 02/01/17 12:16 PM    Narrative    Test Reason : I35.0  Vent. Rate : 077 BPM     Atrial Rate : 077 BPM     P-R Int : 170 ms          QRS Dur : 102 ms      QT Int : 354 ms       P-R-T Axes : 033 019 027 degrees     QTc Int : 400 ms    Normal sinus rhythm  Normal ECG  When compared with ECG of 31-AUG-2015 14:56,  No significant change was found  Confirmed by North Cartwright MD ([id number]) on 2/14/2017 8:06:22 AM    Referred By: PARTH GUTIERREZ           Confirmed By:North Cartwright MD       2D ECHO:  TTE:  No results found for this or any  previous visit.    GAGANDEEP:  No results found for this or any previous visit.    ASSESSMENT/PLAN:                                                                                                              10/19/2022  Steve Carranza is a 61 y.o., male.    Pre-op Assessment    I have reviewed the Patient Summary Reports.   I have reviewed the NPO Status.   I have reviewed the Medications.     Review of Systems  Anesthesia Hx:  No problems with previous Anesthesia  Denies Family Hx of Anesthesia complications.   Denies Personal Hx of Anesthesia complications.   Social:  Non-Smoker, Alcohol Use    Hematology/Oncology:  Hematology Normal   Oncology Normal     EENT/Dental:EENT/Dental Normal   Cardiovascular:   Exercise tolerance: good Hypertension  Denies Angina.  Denies GILL.    Pulmonary:  Pulmonary Normal    Renal/:  Renal/ Normal     Hepatic/GI:   GERD    Musculoskeletal:  Musculoskeletal Normal    Neurological:  Neurology Normal    Endocrine:  Endocrine Normal    Dermatological:  Skin Normal    Psych:  Psychiatric Normal           Physical Exam  General:  Well nourished, Cooperative and Alert      Airway/Jaw/Neck:  Airway Findings: Mouth Opening: Normal   Tongue: Normal   General Airway Assessment: Adult Mallampati: II  Improves to II with phonation.  TM Distance: Normal, at least 6 cm   Jaw/Neck Findings:  Neck ROM: Normal ROM      Eyes/Ears/Nose:  Eyes/Ears/Nose Findings:    Dental:  Dental Findings: In tact     Chest/Lungs:  Chest/Lungs Findings: Clear to auscultation, Normal Respiratory Rate      Heart/Vascular:  Heart Findings: Rate: Normal  Rhythm: Regular Rhythm  Sounds: Normal        Mental Status:  Mental Status Findings:  Cooperative, Alert and Oriented         Anesthesia Plan  Type of Anesthesia, risks & benefits discussed:  Anesthesia Type:  Gen Natural Airway    Patient's Preference:   Plan Factors:          Intra-op Monitoring Plan: Standard ASA Monitors  Intra-op Monitoring Plan Comments:   Post Op  Pain Control Plan: multimodal analgesia  Post Op Pain Control Plan Comments:     Induction:   IV  Beta Blocker:  Patient is on a Beta-Blocker and has received one dose within the past 24 hours (No further documentation required).       Informed Consent: Informed consent signed with the Patient and all parties understand the risks and agree with anesthesia plan.  All questions answered.  Anesthesia consent signed with patient.  ASA Score: 2     Day of Surgery Review of History & Physical:              Ready For Surgery From Anesthesia Perspective.           Physical Exam  General: Well nourished, Cooperative and Alert    Airway:  Mallampati: II / II  Mouth Opening: Normal  TM Distance: Normal, at least 6 cm  Tongue: Normal  Neck ROM: Normal ROM    Dental:  In tact    Chest/Lungs:  Clear to auscultation, Normal Respiratory Rate    Heart:  Rate: Normal  Rhythm: Regular Rhythm  Sounds: Normal          Anesthesia Plan  Type of Anesthesia, risks & benefits discussed:    Anesthesia Type: Gen Natural Airway  Intra-op Monitoring Plan: Standard ASA Monitors  Post Op Pain Control Plan: multimodal analgesia  Induction:  IV  Informed Consent: Informed consent signed with the Patient and all parties understand the risks and agree with anesthesia plan.  All questions answered.   ASA Score: 2    Ready For Surgery From Anesthesia Perspective.       .

## 2022-10-19 NOTE — PROVATION PATIENT INSTRUCTIONS
Discharge Summary/Instructions after an Endoscopic Procedure  Patient Name: Steve Carranza  Patient MRN: [id number]  Patient YOB: 1960 Wednesday, October 19, 2022  Radames Cerna MD  Dear patient,  As a result of recent federal legislation (The Federal Cures Act), you may   receive lab or pathology results from your procedure in your [facility]   account before your physician is able to contact you. Your physician or   their representative will relay the results to you with their   recommendations at their soonest availability.  Thank you,  RESTRICTIONS:  During your procedure today, you received medications for sedation.  These   medications may affect your judgment, balance and coordination.  Therefore,   for 24 hours, you have the following restrictions:   - DO NOT drive a car, operate machinery, make legal/financial decisions,   sign important papers or drink alcohol.    ACTIVITY:  Today: no heavy lifting, straining or running due to procedural   sedation/anesthesia.  The following day: return to full activity including work.  DIET:  Eat and drink normally unless instructed otherwise.     TREATMENT FOR COMMON SIDE EFFECTS:  - Mild abdominal pain, nausea, belching, bloating or excessive gas:  rest,   eat lightly and use a heating pad.  - Sore Throat: treat with throat lozenges and/or gargle with warm salt   water.  - Because air was used during the procedure, expelling large amounts of air   from your rectum or belching is normal.  - If a bowel prep was taken, you may not have a bowel movement for 1-3 days.    This is normal.  SYMPTOMS TO WATCH FOR AND REPORT TO YOUR PHYSICIAN:  1. Abdominal pain or bloating, other than gas cramps.  2. Chest pain.  3. Back pain.  4. Signs of infection such as: chills or fever occurring within 24 hours   after the procedure.  5. Rectal bleeding, which would show as bright red, maroon, or black stools.   (A tablespoon of blood from the rectum is not serious,  especially if   hemorrhoids are present.)  6. Vomiting.  7. Weakness or dizziness.  GO DIRECTLY TO THE NEAREST EMERGENCY ROOM IF YOU HAVE ANY OF THE FOLLOWING:      Difficulty breathing              Chills and/or fever over 101 F   Persistent vomiting and/or vomiting blood   Severe abdominal pain   Severe chest pain   Black, tarry stools   Bleeding- more than one tablespoon   Any other symptom or condition that you feel may need urgent attention  Your doctor recommends these additional instructions:  If any biopsies were taken, your doctors clinic will contact you in 1 to 2   weeks with any results.  - Discharge patient to home.   - Resume previous diet.   - Continue present medications.   - Await pathology results.   - Repeat colonoscopy for surveillance based on pathology results.   - Return to referring physician as previously scheduled.  For questions, problems or results please call your physician - Radames Cerna MD at Work:  [phone].  [facility] EMERGENCY ROOM PHONE NUMBER: [phone]  IF A COMPLICATION OR EMERGENCY SITUATION ARISES AND YOU ARE UNABLE TO REACH   YOUR PHYSICIAN - GO DIRECTLY TO THE EMERGENCY ROOM.  MD Radamse Stone MD  10/19/2022 8:36:00 AM  This report has been verified and signed electronically.  Dear patient,  As a result of recent federal legislation (The Federal Cures Act), you may   receive lab or pathology results from your procedure in your [facility]   account before your physician is able to contact you. Your physician or   their representative will relay the results to you with their   recommendations at their soonest availability.  Thank you,  PROVATION

## 2022-10-19 NOTE — H&P
[facility] 4th Floor  Colorectal Surgery  History & Physical    Date of procedure: 10/19/2022    Procedure: Colonoscopy  Indications: Previous adenomatous polyp and Screening for colon cancer    Last colonoscopy: 2015 (Complete)  FINAL PATHOLOGIC DIAGNOSIS COLON, ASCENDING, POLYPECTOMY: Tubular adenoma    The patient was informed of the availability of a certified  without charge. A certified  was not necessary for this visit.    Sedation plan: MAC  ASA: ASA 1 - Normal health patient    Review of Systems  See above    Past Medical History:   Diagnosis Date    Anxiety 4/22/2021    Hypertension      Past Surgical History:   Procedure Laterality Date    COLONOSCOPY  06/2016    KNEE SURGERY      bilateral    LAPAROSCOPIC CHOLECYSTECTOMY N/A 1/24/2022    Procedure: CHOLECYSTECTOMY, LAPAROSCOPIC;  Surgeon: Woo Lopez Jr., MD;  Location: [facility] OR [address] [place] [address];  Service: General;  Laterality: N/A;  consent in am    REFRACTIVE SURGERY      TONSILLECTOMY       Family History   Problem Relation Age of Onset    Glaucoma Mother     Anxiety disorder Neg Hx      Social History     Tobacco Use    Smoking status: Never    Smokeless tobacco: Never   Substance Use Topics    Alcohol use: Yes     Alcohol/week: 6.0 standard drinks     Types: 6 Glasses of wine per week     Comment: every couple of weeks    Drug use: No     Review of patient's allergies indicates:   Allergen Reactions    Penicillins      As a child       Prior to Admission medications    Medication Sig Start Date End Date Taking? Authorizing Provider   amlodipine (NORVASC) 10 MG tablet Take 10 mg by mouth once daily.   Yes Historical Provider   clonazePAM (KLONOPIN) 0.5 MG tablet Take 1 tablet (0.5 mg total) by mouth 3 (three) times daily as needed for Anxiety. 8/31/22  Yes Henri Queen MD   EScitalopram oxalate (LEXAPRO) 10 MG tablet Take 1 tablet (10 mg total) by mouth once daily. 8/31/22 8/31/23 Yes Henri Queen MD    meloxicam (MOBIC) 15 MG tablet Take 1 tablet (15 mg total) by mouth once daily. 8/4/22  Yes RHONA Christianson MD   metoprolol succinate (TOPROL-XL) 100 MG 24 hr tablet TAKE 1 TABLET(100 MG) BY MOUTH EVERY EVENING 1/23/19  Yes Daniel Lozano MD       Physical Examination  There were no vitals taken for this visit.     Constitutional: well developed, no cough, no dyspnea, alert, and no acute distress    Head: Normocephalic, no lesions, without obvious abnormality  Eye: Normal external eye, conjunctiva, and lids, PERRL  Ears: Normal TM's bilaterally, normal auditory canals and external ears, non-tender  Cardiovascular: regular rate and regular rhythm  Respiratory: normal air entry  Gastrointestinal: soft, non-tender, without masses or organomegaly  Neurologic: alert, oriented, normal speech, no focal findings or movement disorder noted  Psychiatric: appropriate, normal mood    Plan of Care    It was a pleasure meeting Mr. Carranza today - we will plan to perform a colonoscopy with monitored anesthesia care. The details of the procedure, the possible need for biopsy or polypectomy and the potential risks including bleeding, perforation, missed polyps were discussed in detail and they consented to undergo the procedure.      Lupe Dorantes MD  Colorectal Surgery  [facility] 4th Floor

## 2022-10-19 NOTE — TRANSFER OF CARE
"Anesthesia Transfer of Care Note    Patient: Steve Carranza    Procedure(s) Performed: Procedure(s) (LRB):  COLONOSCOPY (N/A)    Patient location: GI    Anesthesia Type: general    Transport from OR: Transported from OR on room air with adequate spontaneous ventilation    Post pain: adequate analgesia    Post assessment: no apparent anesthetic complications    Post vital signs: stable    Level of consciousness: awake and alert    Nausea/Vomiting: no nausea/vomiting    Complications: none    Transfer of care protocol was followed      Last vitals:   Visit Vitals  /80   Pulse 68   Temp 37.1 °C (98.8 °F)   Resp 18   Ht 5' 9" (1.753 m)   Wt 90.7 kg (200 lb)   SpO2 99%   BMI 29.53 kg/m²     "

## 2022-10-19 NOTE — ANESTHESIA POSTPROCEDURE EVALUATION
Anesthesia Post Evaluation    Patient: Steve Carranza    Procedure(s) Performed: Procedure(s) (LRB):  COLONOSCOPY (N/A)    Final Anesthesia Type: general      Patient location during evaluation: PACU  Patient participation: Yes- Able to Participate  Level of consciousness: awake and alert and oriented  Post-procedure vital signs: reviewed and stable  Pain management: adequate  Airway patency: patent    PONV status at discharge: No PONV  Anesthetic complications: no      Cardiovascular status: blood pressure returned to baseline  Respiratory status: unassisted, room air and spontaneous ventilation  Hydration status: euvolemic  Follow-up not needed.          Vitals Value Taken Time   /69 10/19/22 0840   Temp 36.7 °C (98.1 °F) 10/19/22 0840   Pulse 66 10/19/22 0840   Resp 14 10/19/22 0840   SpO2 96 % 10/19/22 0840         No case tracking events are documented in the log.      Pain/Mai Score: Mai Score: 9 (10/19/2022  8:46 AM)

## 2022-10-24 LAB
FINAL PATHOLOGIC DIAGNOSIS: NORMAL
GROSS: NORMAL
Lab: NORMAL

## 2023-04-17 ENCOUNTER — CLINICAL SUPPORT (OUTPATIENT)
Dept: OTHER | Facility: CLINIC | Age: 63
End: 2023-04-17
Payer: COMMERCIAL

## 2023-04-17 DIAGNOSIS — Z00.8 ENCOUNTER FOR OTHER GENERAL EXAMINATION: ICD-10-CM

## 2023-04-19 VITALS
BODY MASS INDEX: 31.4 KG/M2 | SYSTOLIC BLOOD PRESSURE: 148 MMHG | DIASTOLIC BLOOD PRESSURE: 82 MMHG | WEIGHT: 212 LBS | HEIGHT: 69 IN

## 2023-04-19 LAB
HDLC SERPL-MCNC: 57 MG/DL
POC CHOLESTEROL, LDL (DOCK): 119 MG/DL
POC CHOLESTEROL, TOTAL: 186 MG/DL
POC GLUCOSE, FASTING: 94 MG/DL (ref 60–110)
TRIGL SERPL-MCNC: 50 MG/DL

## 2023-05-11 ENCOUNTER — CLINICAL SUPPORT (OUTPATIENT)
Dept: ADMINISTRATIVE | Facility: CLINIC | Age: 63
End: 2023-05-11
Payer: COMMERCIAL

## 2023-05-11 DIAGNOSIS — Z00.00 ROUTINE GENERAL MEDICAL EXAMINATION AT A HEALTH CARE FACILITY: Primary | ICD-10-CM

## 2023-05-11 LAB
ALBUMIN SERPL BCP-MCNC: 5.1 G/DL (ref 3.5–5.2)
ALP SERPL-CCNC: 67 U/L (ref 55–135)
ALT SERPL W/O P-5'-P-CCNC: 29 U/L (ref 10–44)
ANION GAP SERPL CALC-SCNC: 15 MMOL/L (ref 8–16)
AST SERPL-CCNC: 30 U/L (ref 10–40)
BASOPHILS # BLD AUTO: 0.05 K/UL (ref 0–0.2)
BASOPHILS NFR BLD: 0.8 % (ref 0–1.9)
BILIRUB SERPL-MCNC: 0.9 MG/DL (ref 0.1–1)
BILIRUB UR QL STRIP: NEGATIVE
BUN SERPL-MCNC: 14 MG/DL (ref 6–20)
CALCIUM SERPL-MCNC: 10.6 MG/DL (ref 8.7–10.5)
CHLORIDE SERPL-SCNC: 103 MMOL/L (ref 95–110)
CHOLEST SERPL-MCNC: 218 MG/DL (ref 120–199)
CHOLEST/HDLC SERPL: 3.8 {RATIO} (ref 2–5)
CLARITY UR REFRACT.AUTO: CLEAR
CO2 SERPL-SCNC: 26 MMOL/L (ref 23–29)
COLOR UR AUTO: YELLOW
COMPLEXED PSA SERPL-MCNC: 0.38 NG/ML (ref 0–4)
CREAT SERPL-MCNC: 1 MG/DL (ref 0.5–1.4)
DIFFERENTIAL METHOD: ABNORMAL
EOSINOPHIL # BLD AUTO: 0.2 K/UL (ref 0–0.5)
EOSINOPHIL NFR BLD: 3.1 % (ref 0–8)
ERYTHROCYTE [DISTWIDTH] IN BLOOD BY AUTOMATED COUNT: 12.4 % (ref 11.5–14.5)
EST. GFR  (NO RACE VARIABLE): >60 ML/MIN/1.73 M^2
ESTIMATED AVG GLUCOSE: 80 MG/DL (ref 68–131)
GLUCOSE SERPL-MCNC: 92 MG/DL (ref 70–110)
GLUCOSE UR QL STRIP: NEGATIVE
HBA1C MFR BLD: 4.4 % (ref 4–5.6)
HCT VFR BLD AUTO: 44.9 % (ref 40–54)
HDLC SERPL-MCNC: 57 MG/DL (ref 40–75)
HDLC SERPL: 26.1 % (ref 20–50)
HGB BLD-MCNC: 15.3 G/DL (ref 14–18)
HGB UR QL STRIP: NEGATIVE
IMM GRANULOCYTES # BLD AUTO: 0.02 K/UL (ref 0–0.04)
IMM GRANULOCYTES NFR BLD AUTO: 0.3 % (ref 0–0.5)
KETONES UR QL STRIP: NEGATIVE
LDLC SERPL CALC-MCNC: 149 MG/DL (ref 63–159)
LEUKOCYTE ESTERASE UR QL STRIP: NEGATIVE
LYMPHOCYTES # BLD AUTO: 2.1 K/UL (ref 1–4.8)
LYMPHOCYTES NFR BLD: 35.1 % (ref 18–48)
MCH RBC QN AUTO: 31.8 PG (ref 27–31)
MCHC RBC AUTO-ENTMCNC: 34.1 G/DL (ref 32–36)
MCV RBC AUTO: 93 FL (ref 82–98)
MONOCYTES # BLD AUTO: 0.6 K/UL (ref 0.3–1)
MONOCYTES NFR BLD: 10.1 % (ref 4–15)
NEUTROPHILS # BLD AUTO: 3.1 K/UL (ref 1.8–7.7)
NEUTROPHILS NFR BLD: 50.6 % (ref 38–73)
NITRITE UR QL STRIP: NEGATIVE
NONHDLC SERPL-MCNC: 161 MG/DL
NRBC BLD-RTO: 0 /100 WBC
PH UR STRIP: 6 [PH] (ref 5–8)
PLATELET # BLD AUTO: 284 K/UL (ref 150–450)
PMV BLD AUTO: 10.3 FL (ref 9.2–12.9)
POTASSIUM SERPL-SCNC: 4.7 MMOL/L (ref 3.5–5.1)
PROT SERPL-MCNC: 8.1 G/DL (ref 6–8.4)
PROT UR QL STRIP: NEGATIVE
RBC # BLD AUTO: 4.81 M/UL (ref 4.6–6.2)
SODIUM SERPL-SCNC: 144 MMOL/L (ref 136–145)
SP GR UR STRIP: 1.01 (ref 1–1.03)
TESTOST SERPL-MCNC: 650 NG/DL (ref 304–1227)
TRIGL SERPL-MCNC: 60 MG/DL (ref 30–150)
TSH SERPL DL<=0.005 MIU/L-ACNC: 3.54 UIU/ML (ref 0.4–4)
URN SPEC COLLECT METH UR: NORMAL
WBC # BLD AUTO: 6.04 K/UL (ref 3.9–12.7)

## 2023-05-11 PROCEDURE — 93005 EKG 12-LEAD: ICD-10-PCS | Mod: S$GLB,,, | Performed by: INTERNAL MEDICINE

## 2023-05-11 PROCEDURE — 93010 EKG 12-LEAD: ICD-10-PCS | Mod: S$GLB,ICN,, | Performed by: INTERNAL MEDICINE

## 2023-05-11 PROCEDURE — 83036 HEMOGLOBIN GLYCOSYLATED A1C: CPT | Performed by: INTERNAL MEDICINE

## 2023-05-11 PROCEDURE — 84153 ASSAY OF PSA TOTAL: CPT | Performed by: INTERNAL MEDICINE

## 2023-05-11 PROCEDURE — 80061 LIPID PANEL: CPT | Performed by: INTERNAL MEDICINE

## 2023-05-11 PROCEDURE — 80053 COMPREHEN METABOLIC PANEL: CPT | Performed by: INTERNAL MEDICINE

## 2023-05-11 PROCEDURE — 84403 ASSAY OF TOTAL TESTOSTERONE: CPT | Performed by: INTERNAL MEDICINE

## 2023-05-11 PROCEDURE — 84443 ASSAY THYROID STIM HORMONE: CPT | Performed by: INTERNAL MEDICINE

## 2023-05-11 PROCEDURE — 93005 ELECTROCARDIOGRAM TRACING: CPT | Mod: S$GLB,,, | Performed by: INTERNAL MEDICINE

## 2023-05-11 PROCEDURE — 85025 COMPLETE CBC W/AUTO DIFF WBC: CPT | Performed by: INTERNAL MEDICINE

## 2023-05-11 PROCEDURE — 93010 ELECTROCARDIOGRAM REPORT: CPT | Mod: S$GLB,ICN,, | Performed by: INTERNAL MEDICINE

## 2023-05-11 PROCEDURE — 81003 URINALYSIS AUTO W/O SCOPE: CPT | Performed by: INTERNAL MEDICINE

## 2023-05-23 DIAGNOSIS — Z00.00 ROUTINE GENERAL MEDICAL EXAMINATION AT A HEALTH CARE FACILITY: Primary | ICD-10-CM

## 2023-05-24 DIAGNOSIS — Z13.6 ENCOUNTER FOR SCREENING FOR CARDIOVASCULAR DISORDERS: ICD-10-CM

## 2023-05-24 DIAGNOSIS — Z00.00 ANNUAL PHYSICAL EXAM: Primary | ICD-10-CM
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
## 2023-12-04 ENCOUNTER — CLINICAL SUPPORT (OUTPATIENT)
Dept: AUDIOLOGY | Facility: CLINIC | Age: 63
End: 2023-12-04
Payer: COMMERCIAL

## 2023-12-04 ENCOUNTER — OFFICE VISIT (OUTPATIENT)
Dept: OTOLARYNGOLOGY | Facility: CLINIC | Age: 63
End: 2023-12-04
Payer: COMMERCIAL

## 2023-12-04 DIAGNOSIS — H90.3 ASYMMETRICAL SENSORINEURAL HEARING LOSS: Primary | ICD-10-CM

## 2023-12-04 DIAGNOSIS — H90.3 ASNHL (ASYMMETRICAL SENSORINEURAL HEARING LOSS): ICD-10-CM

## 2023-12-04 DIAGNOSIS — H91.22 SUDDEN HEARING LOSS, LEFT: Primary | ICD-10-CM

## 2023-12-04 DIAGNOSIS — H93.12 TINNITUS, LEFT: ICD-10-CM

## 2023-12-04 DIAGNOSIS — H92.02 OTALGIA OF LEFT EAR: ICD-10-CM

## 2023-12-04 DIAGNOSIS — H83.3X2 NOISE-INDUCED HEARING LOSS OF LEFT EAR, UNSPECIFIED HEARING STATUS ON CONTRALATERAL SIDE: ICD-10-CM

## 2023-12-04 DIAGNOSIS — H91.92 HIGH-FREQUENCY HEARING LOSS OF LEFT EAR: ICD-10-CM

## 2023-12-04 DIAGNOSIS — H93.12 TINNITUS OF LEFT EAR: ICD-10-CM

## 2023-12-04 PROCEDURE — 99999 PR PBB SHADOW E&M-EST. PATIENT-LVL II: CPT | Mod: PBBFAC,,,

## 2023-12-04 PROCEDURE — 4010F ACE/ARB THERAPY RXD/TAKEN: CPT | Mod: CPTII,S$GLB,,

## 2023-12-04 PROCEDURE — 92557 COMPREHENSIVE HEARING TEST: CPT | Mod: S$GLB,,, | Performed by: AUDIOLOGIST

## 2023-12-04 PROCEDURE — 1160F PR REVIEW ALL MEDS BY PRESCRIBER/CLIN PHARMACIST DOCUMENTED: ICD-10-PCS | Mod: CPTII,S$GLB,,

## 2023-12-04 PROCEDURE — 99999 PR PBB SHADOW E&M-EST. PATIENT-LVL I: ICD-10-PCS | Mod: PBBFAC,,, | Performed by: AUDIOLOGIST

## 2023-12-04 PROCEDURE — 99999 PR PBB SHADOW E&M-EST. PATIENT-LVL I: CPT | Mod: PBBFAC,,, | Performed by: AUDIOLOGIST

## 2023-12-04 PROCEDURE — 99203 OFFICE O/P NEW LOW 30 MIN: CPT | Mod: S$GLB,,,

## 2023-12-04 PROCEDURE — 3044F PR MOST RECENT HEMOGLOBIN A1C LEVEL <7.0%: ICD-10-PCS | Mod: CPTII,S$GLB,,

## 2023-12-04 PROCEDURE — 99203 PR OFFICE/OUTPT VISIT, NEW, LEVL III, 30-44 MIN: ICD-10-PCS | Mod: S$GLB,,,

## 2023-12-04 PROCEDURE — 1159F PR MEDICATION LIST DOCUMENTED IN MEDICAL RECORD: ICD-10-PCS | Mod: CPTII,S$GLB,,

## 2023-12-04 PROCEDURE — 1159F MED LIST DOCD IN RCRD: CPT | Mod: CPTII,S$GLB,,

## 2023-12-04 PROCEDURE — 92557 PR COMPREHENSIVE HEARING TEST: ICD-10-PCS | Mod: S$GLB,,, | Performed by: AUDIOLOGIST

## 2023-12-04 PROCEDURE — 99999 PR PBB SHADOW E&M-EST. PATIENT-LVL II: ICD-10-PCS | Mod: PBBFAC,,,

## 2023-12-04 PROCEDURE — 3044F HG A1C LEVEL LT 7.0%: CPT | Mod: CPTII,S$GLB,,

## 2023-12-04 PROCEDURE — 92567 TYMPANOMETRY: CPT | Mod: S$GLB,,, | Performed by: AUDIOLOGIST

## 2023-12-04 PROCEDURE — 4010F PR ACE/ARB THEARPY RXD/TAKEN: ICD-10-PCS | Mod: CPTII,S$GLB,,

## 2023-12-04 PROCEDURE — 1160F RVW MEDS BY RX/DR IN RCRD: CPT | Mod: CPTII,S$GLB,,

## 2023-12-04 PROCEDURE — 92567 PR TYMPA2METRY: ICD-10-PCS | Mod: S$GLB,,, | Performed by: AUDIOLOGIST

## 2023-12-04 RX ORDER — PREDNISONE 5 MG/1
TABLET ORAL
Qty: 90 TABLET | Refills: 0 | Status: SHIPPED | OUTPATIENT
Start: 2023-12-04
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
## 2024-01-29 ENCOUNTER — TELEPHONE (OUTPATIENT)
Dept: INFECTIOUS DISEASES | Facility: CLINIC | Age: 64
End: 2024-01-29
Payer: COMMERCIAL

## 2024-01-29 ENCOUNTER — OFFICE VISIT (OUTPATIENT)
Dept: INFECTIOUS DISEASES | Facility: CLINIC | Age: 64
End: 2024-01-29
Payer: COMMERCIAL

## 2024-01-29 VITALS — HEART RATE: 71 BPM | SYSTOLIC BLOOD PRESSURE: 150 MMHG | TEMPERATURE: 99 F | DIASTOLIC BLOOD PRESSURE: 88 MMHG

## 2024-01-29 DIAGNOSIS — J02.9 SORE THROAT: Primary | ICD-10-CM

## 2024-01-29 DIAGNOSIS — R05.9 COUGH, UNSPECIFIED TYPE: ICD-10-CM

## 2024-01-29 LAB
INFLUENZA A, MOLECULAR: NOT DETECTED
INFLUENZA B, MOLECULAR: NOT DETECTED
RSV AG BY MOLECULAR METHOD: NOT DETECTED
SARS-COV-2 RNA RESP QL NAA+PROBE: DETECTED

## 2024-01-29 PROCEDURE — 1160F RVW MEDS BY RX/DR IN RCRD: CPT | Mod: CPTII,S$GLB,, | Performed by: INTERNAL MEDICINE

## 2024-01-29 PROCEDURE — 1159F MED LIST DOCD IN RCRD: CPT | Mod: CPTII,S$GLB,, | Performed by: INTERNAL MEDICINE

## 2024-01-29 PROCEDURE — 0241U SARS-COV2 (COVID) WITH FLU/RSV BY PCR: CPT | Performed by: INTERNAL MEDICINE

## 2024-01-29 PROCEDURE — 99999 PR PBB SHADOW E&M-EST. PATIENT-LVL III: CPT | Mod: PBBFAC,,, | Performed by: INTERNAL MEDICINE

## 2024-01-29 PROCEDURE — 3079F DIAST BP 80-89 MM HG: CPT | Mod: CPTII,S$GLB,, | Performed by: INTERNAL MEDICINE

## 2024-01-29 PROCEDURE — 3077F SYST BP >= 140 MM HG: CPT | Mod: CPTII,S$GLB,, | Performed by: INTERNAL MEDICINE

## 2024-01-29 PROCEDURE — 99203 OFFICE O/P NEW LOW 30 MIN: CPT | Mod: S$GLB,,, | Performed by: INTERNAL MEDICINE

## 2024-01-29 NOTE — PROGRESS NOTES
.aSubjective:      Patient ID: Steve Carranza is a 63 y.o. male.    Chief Complaint:No chief complaint on file.      History of Present Illness    Here today for urgent care visit.  Was out of town in [place].   Friday night awoke at 3 am -throat pain was most significant symptoms.   Has not slept well.  Saturday started Z pack and medrol.   Some minor improvement but still feeling poorly.  Can't swallow well. Underwent tonsillectomy at age 14.  One of his children had strep about 1-2 weeks ago.    Home covid test negative.    Review of Systems   Constitutional: Negative for chills, decreased appetite, fever, malaise/fatigue, night sweats, weight gain and weight loss.   HENT:  Positive for sore throat. Negative for congestion, ear pain, hearing loss, hoarse voice and tinnitus.    Eyes:  Negative for blurred vision, redness and visual disturbance.   Cardiovascular:  Negative for chest pain, leg swelling and palpitations.   Respiratory:  Positive for cough. Negative for hemoptysis, shortness of breath, sputum production and wheezing.    Hematologic/Lymphatic: Negative for adenopathy. Does not bruise/bleed easily.   Skin:  Negative for dry skin, itching, rash and suspicious lesions.   Musculoskeletal:  Negative for back pain, joint pain, myalgias and neck pain.   Gastrointestinal:  Negative for abdominal pain, constipation, diarrhea, heartburn, nausea and vomiting.   Genitourinary:  Negative for dysuria, flank pain, frequency, hematuria, hesitancy and urgency.   Neurological:  Negative for dizziness, headaches, numbness, paresthesias and weakness.   Psychiatric/Behavioral:  Negative for depression and memory loss. The patient does not have insomnia and is not nervous/anxious.      Objective:   Physical Exam  Vitals and nursing note reviewed.   Constitutional:       General: He is not in acute distress.     Appearance: He is well-developed. He is not toxic-appearing or diaphoretic.      Comments: Appears fatigued, pale.    HENT:      Head: Normocephalic and atraumatic.      Right Ear: Tympanic membrane and external ear normal.      Left Ear: Tympanic membrane and external ear normal.      Nose: Nose normal.      Mouth/Throat:      Mouth: Mucous membranes are moist.      Pharynx: Posterior oropharyngeal erythema present. No oropharyngeal exudate.   Eyes:      General: Lids are normal. No scleral icterus.        Right eye: No discharge.         Left eye: No discharge.      Conjunctiva/sclera: Conjunctivae normal.      Right eye: Right conjunctiva is not injected.      Left eye: Left conjunctiva is not injected.      Pupils: Pupils are equal, round, and reactive to light.   Neck:      Thyroid: No thyromegaly.      Trachea: No tracheal deviation.   Cardiovascular:      Rate and Rhythm: Normal rate and regular rhythm.      Heart sounds: Normal heart sounds, S1 normal and S2 normal. No murmur heard.     No friction rub. No gallop.   Pulmonary:      Effort: Pulmonary effort is normal. No respiratory distress.      Breath sounds: Normal breath sounds. No stridor. No wheezing or rales.   Chest:      Chest wall: No tenderness.   Abdominal:      General: Bowel sounds are normal. There is no distension.      Palpations: Abdomen is soft.      Tenderness: There is no abdominal tenderness. There is no guarding or rebound.   Musculoskeletal:         General: No tenderness. Normal range of motion.      Cervical back: Normal range of motion and neck supple.   Skin:     General: Skin is warm and dry.      Coloration: Skin is not pale.      Findings: No erythema or rash.      Nails: There is no clubbing.   Neurological:      Mental Status: He is alert and oriented to person, place, and time.      Cranial Nerves: No cranial nerve deficit.      Motor: No abnormal muscle tone.      Coordination: Coordination normal.   Psychiatric:         Speech: Speech normal.         Behavior: Behavior normal. Behavior is cooperative.         Thought Content: Thought  content normal.         Judgment: Judgment normal.         1. Sore throat    2. Cough, unspecified type        Plan:      Diagnoses and all orders for this visit:    Sore throat  -     SARS-Cov2 (COVID) with FLU/RSV by PCR; Future  -     Cancel: POCT rapid strep A; Future  -     Cancel: POCT rapid strep A  -     SARS-Cov2 (COVID) with FLU/RSV by PCR  -     Cancel: Strep A culture, throat    Cough, unspecified type  -     Cancel: X-Ray Chest PA And Lateral; Future    Other orders  -     Cancel: Throat culture    Collected nasopharyngeal swab and throat culture.  Continue z pack since he has had close contact with strep.   Supportive care.  No stridor or exudates seen on exam.  Follow up test results. Call if worsening.

## 2024-01-29 NOTE — TELEPHONE ENCOUNTER
Discussed results- still waiting on strep- his son had sterp a few weeks ago. Continue z pack, medrol. Stop tamiflu.  Start paxlovid.

## 2024-01-31 ENCOUNTER — TELEPHONE (OUTPATIENT)
Dept: INFECTIOUS DISEASES | Facility: CLINIC | Age: 64
End: 2024-01-31
Payer: COMMERCIAL

## 2024-02-15 ENCOUNTER — PATIENT MESSAGE (OUTPATIENT)
Dept: INFECTIOUS DISEASES | Facility: CLINIC | Age: 64
End: 2024-02-15
Payer: COMMERCIAL

## 2024-05-22 ENCOUNTER — CLINICAL SUPPORT (OUTPATIENT)
Dept: INTERNAL MEDICINE | Facility: CLINIC | Age: 64
End: 2024-05-22
Payer: COMMERCIAL

## 2024-05-22 ENCOUNTER — HOSPITAL ENCOUNTER (OUTPATIENT)
Dept: RADIOLOGY | Facility: HOSPITAL | Age: 64
Discharge: HOME | End: 2024-05-22
Attending: INTERNAL MEDICINE
Payer: COMMERCIAL

## 2024-05-22 ENCOUNTER — HOSPITAL ENCOUNTER (OUTPATIENT)
Dept: CARDIOLOGY | Facility: HOSPITAL | Age: 64
Discharge: HOME | End: 2024-05-22
Attending: INTERNAL MEDICINE
Payer: COMMERCIAL

## 2024-05-22 VITALS — HEIGHT: 69 IN | BODY MASS INDEX: 31.4 KG/M2 | WEIGHT: 212 LBS

## 2024-05-22 DIAGNOSIS — Z13.6 ENCOUNTER FOR SCREENING FOR CARDIOVASCULAR DISORDERS: ICD-10-CM

## 2024-05-22 DIAGNOSIS — Z00.00 ANNUAL PHYSICAL EXAM: ICD-10-CM

## 2024-05-22 DIAGNOSIS — Z00.00 ROUTINE GENERAL MEDICAL EXAMINATION AT A HEALTH CARE FACILITY: ICD-10-CM

## 2024-05-22 LAB
ALBUMIN SERPL BCP-MCNC: 4.9 G/DL (ref 3.5–5.2)
ALP SERPL-CCNC: 68 U/L (ref 55–135)
ALT SERPL W/O P-5'-P-CCNC: 37 U/L (ref 10–44)
ANION GAP SERPL CALC-SCNC: 11 MMOL/L (ref 8–16)
ASCENDING AORTA: 3.11 CM
AST SERPL-CCNC: 32 U/L (ref 10–40)
AV INDEX (PROSTH): 0.81
AV MEAN GRADIENT: 2 MMHG
AV PEAK GRADIENT: 4 MMHG
AV VALVE AREA BY VELOCITY RATIO: 3.05 CM²
AV VALVE AREA: 2.69 CM²
AV VELOCITY RATIO: 0.92
BASOPHILS # BLD AUTO: 0.04 K/UL (ref 0–0.2)
BASOPHILS NFR BLD: 0.7 % (ref 0–1.9)
BILIRUB SERPL-MCNC: 0.9 MG/DL (ref 0.1–1)
BSA FOR ECHO PROCEDURE: 2.16 M2
BUN SERPL-MCNC: 12 MG/DL (ref 8–23)
CALCIUM SERPL-MCNC: 10.3 MG/DL (ref 8.7–10.5)
CHLORIDE SERPL-SCNC: 106 MMOL/L (ref 95–110)
CHOLEST SERPL-MCNC: 132 MG/DL (ref 120–199)
CHOLEST/HDLC SERPL: 2.5 {RATIO} (ref 2–5)
CO2 SERPL-SCNC: 22 MMOL/L (ref 23–29)
COMPLEXED PSA SERPL-MCNC: 0.34 NG/ML (ref 0–4)
CREAT SERPL-MCNC: 1.2 MG/DL (ref 0.5–1.4)
CV ECHO LV RWT: 0.3 CM
CV STRESS BASE HR: 61 BPM
DIASTOLIC BLOOD PRESSURE: 72 MMHG
DIFFERENTIAL METHOD BLD: ABNORMAL
DOP CALC AO PEAK VEL: 1.04 M/S
DOP CALC AO VTI: 23.58 CM
DOP CALC LVOT AREA: 3.3 CM2
DOP CALC LVOT DIAMETER: 2.05 CM
DOP CALC LVOT PEAK VEL: 0.96 M/S
DOP CALC LVOT STROKE VOLUME: 63.37 CM3
DOP CALCLVOT PEAK VEL VTI: 19.21 CM
E WAVE DECELERATION TIME: 206.44 MSEC
E/A RATIO: 1.13
E/E' RATIO: 7.26 M/S
ECHO LV POSTERIOR WALL: 0.8 CM (ref 0.6–1.1)
EOSINOPHIL # BLD AUTO: 0.2 K/UL (ref 0–0.5)
EOSINOPHIL NFR BLD: 3.7 % (ref 0–8)
ERYTHROCYTE [DISTWIDTH] IN BLOOD BY AUTOMATED COUNT: 12.3 % (ref 11.5–14.5)
EST. GFR  (NO RACE VARIABLE): >60 ML/MIN/1.73 M^2
ESTIMATED AVG GLUCOSE: 82 MG/DL (ref 68–131)
FRACTIONAL SHORTENING: 22 % (ref 28–44)
GLUCOSE SERPL-MCNC: 90 MG/DL (ref 70–110)
HBA1C MFR BLD: 4.5 % (ref 4–5.6)
HCT VFR BLD AUTO: 43.8 % (ref 40–54)
HDLC SERPL-MCNC: 52 MG/DL (ref 40–75)
HDLC SERPL: 39.4 % (ref 20–50)
HGB BLD-MCNC: 15.2 G/DL (ref 14–18)
IMM GRANULOCYTES # BLD AUTO: 0.01 K/UL (ref 0–0.04)
IMM GRANULOCYTES NFR BLD AUTO: 0.2 % (ref 0–0.5)
INTERVENTRICULAR SEPTUM: 0.9 CM (ref 0.6–1.1)
IVC DIAMETER: 1.7 CM
LA MAJOR: 5.83 CM
LA MINOR: 5.19 CM
LA WIDTH: 3.8 CM
LDLC SERPL CALC-MCNC: 68.6 MG/DL (ref 63–159)
LEFT ATRIUM SIZE: 4.65 CM
LEFT ATRIUM VOLUME INDEX MOD: 36.8 ML/M2
LEFT ATRIUM VOLUME INDEX: 38.9 ML/M2
LEFT ATRIUM VOLUME MOD: 78 CM3
LEFT ATRIUM VOLUME: 82.48 CM3
LEFT INTERNAL DIMENSION IN SYSTOLE: 4.14 CM (ref 2.1–4)
LEFT VENTRICLE DIASTOLIC VOLUME INDEX: 70.11 ML/M2
LEFT VENTRICLE DIASTOLIC VOLUME: 148.64 ML
LEFT VENTRICLE MASS INDEX: 76 G/M2
LEFT VENTRICLE SYSTOLIC VOLUME INDEX: 35.8 ML/M2
LEFT VENTRICLE SYSTOLIC VOLUME: 75.86 ML
LEFT VENTRICULAR INTERNAL DIMENSION IN DIASTOLE: 5.3 CM (ref 3.5–6)
LEFT VENTRICULAR MASS: 162.11 G
LV LATERAL E/E' RATIO: 6.9 M/S
LV SEPTAL E/E' RATIO: 7.67 M/S
LYMPHOCYTES # BLD AUTO: 2.3 K/UL (ref 1–4.8)
LYMPHOCYTES NFR BLD: 39.9 % (ref 18–48)
MCH RBC QN AUTO: 31.7 PG (ref 27–31)
MCHC RBC AUTO-ENTMCNC: 34.7 G/DL (ref 32–36)
MCV RBC AUTO: 91 FL (ref 82–98)
MONOCYTES # BLD AUTO: 0.7 K/UL (ref 0.3–1)
MONOCYTES NFR BLD: 12.7 % (ref 4–15)
MV PEAK A VEL: 0.61 M/S
MV PEAK E VEL: 0.69 M/S
MV STENOSIS PRESSURE HALF TIME: 59.87 MS
MV VALVE AREA P 1/2 METHOD: 3.67 CM2
NEUTROPHILS # BLD AUTO: 2.4 K/UL (ref 1.8–7.7)
NEUTROPHILS NFR BLD: 42.8 % (ref 38–73)
NONHDLC SERPL-MCNC: 80 MG/DL
NRBC BLD-RTO: 0 /100 WBC
OHS CV CPX 1 MINUTE RECOVERY HEART RATE: 127 BPM
OHS CV CPX 85 PERCENT MAX PREDICTED HEART RATE MALE: 135
OHS CV CPX ESTIMATED METS: 12
OHS CV CPX MAX PREDICTED HEART RATE: 159
OHS CV CPX PATIENT IS FEMALE: 0
OHS CV CPX PATIENT IS MALE: 1
OHS CV CPX PEAK DIASTOLIC BLOOD PRESSURE: 73 MMHG
OHS CV CPX PEAK HEAR RATE: 144 BPM
OHS CV CPX PEAK RATE PRESSURE PRODUCT: ABNORMAL
OHS CV CPX PEAK SYSTOLIC BLOOD PRESSURE: 175 MMHG
OHS CV CPX PERCENT MAX PREDICTED HEART RATE ACHIEVED: 91
OHS CV CPX RATE PRESSURE PRODUCT PRESENTING: 7320
OHS CV RV/LV RATIO: 0.77 CM
PLATELET # BLD AUTO: 235 K/UL (ref 150–450)
PMV BLD AUTO: 10.7 FL (ref 9.2–12.9)
POST STRESS EJECTION FRACTION: 65 %
POTASSIUM SERPL-SCNC: 5 MMOL/L (ref 3.5–5.1)
PROT SERPL-MCNC: 7.9 G/DL (ref 6–8.4)
RA MAJOR: 5.28 CM
RA PRESSURE ESTIMATED: 3 MMHG
RA WIDTH: 3.91 CM
RBC # BLD AUTO: 4.79 M/UL (ref 4.6–6.2)
RIGHT ATRIAL AREA: 17.5 CM2
RIGHT VENTRICULAR END-DIASTOLIC DIMENSION: 4.07 CM
SINUS: 3.29 CM
SODIUM SERPL-SCNC: 139 MMOL/L (ref 136–145)
STJ: 2.76 CM
STRESS ECHO POST EXERCISE DUR MIN: 7 MINUTES
STRESS ECHO POST EXERCISE DUR SEC: 16 SECONDS
SYSTOLIC BLOOD PRESSURE: 120 MMHG
TDI LATERAL: 0.1 M/S
TDI SEPTAL: 0.09 M/S
TDI: 0.1 M/S
TRICUSPID ANNULAR PLANE SYSTOLIC EXCURSION: 2.19 CM
TRIGL SERPL-MCNC: 57 MG/DL (ref 30–150)
TSH SERPL DL<=0.005 MIU/L-ACNC: 3.7 UIU/ML (ref 0.4–4)
WBC # BLD AUTO: 5.66 K/UL (ref 3.9–12.7)
Z-SCORE OF LEFT VENTRICULAR DIMENSION IN END DIASTOLE: -2.34
Z-SCORE OF LEFT VENTRICULAR DIMENSION IN END SYSTOLE: 0.1

## 2024-05-22 PROCEDURE — 99211 OFF/OP EST MAY X REQ PHY/QHP: CPT | Mod: 25

## 2024-05-22 PROCEDURE — 84153 ASSAY OF PSA TOTAL: CPT | Performed by: INTERNAL MEDICINE

## 2024-05-22 PROCEDURE — 75571 CT HRT W/O DYE W/CA TEST: CPT | Mod: TC

## 2024-05-22 PROCEDURE — 84443 ASSAY THYROID STIM HORMONE: CPT | Performed by: INTERNAL MEDICINE

## 2024-05-22 PROCEDURE — 80061 LIPID PANEL: CPT | Performed by: INTERNAL MEDICINE

## 2024-05-22 PROCEDURE — 93325 DOPPLER ECHO COLOR FLOW MAPG: CPT

## 2024-05-22 PROCEDURE — 75571 CT HRT W/O DYE W/CA TEST: CPT | Mod: 26,,, | Performed by: RADIOLOGY

## 2024-05-22 PROCEDURE — 83036 HEMOGLOBIN GLYCOSYLATED A1C: CPT | Performed by: INTERNAL MEDICINE

## 2024-05-22 PROCEDURE — 93320 DOPPLER ECHO COMPLETE: CPT | Mod: 26,,, | Performed by: INTERNAL MEDICINE

## 2024-05-22 PROCEDURE — 93351 STRESS TTE COMPLETE: CPT | Mod: 26,,, | Performed by: INTERNAL MEDICINE

## 2024-05-22 PROCEDURE — 93325 DOPPLER ECHO COLOR FLOW MAPG: CPT | Mod: 26,,, | Performed by: INTERNAL MEDICINE

## 2024-05-22 PROCEDURE — 80053 COMPREHEN METABOLIC PANEL: CPT | Performed by: INTERNAL MEDICINE

## 2024-05-22 PROCEDURE — 85025 COMPLETE CBC W/AUTO DIFF WBC: CPT | Performed by: INTERNAL MEDICINE

## 2025-01-07 ENCOUNTER — CLINICAL SUPPORT (OUTPATIENT)
Dept: INFECTIOUS DISEASES | Facility: CLINIC | Age: 65
End: 2025-01-07
Payer: COMMERCIAL

## 2025-01-07 DIAGNOSIS — Z00.00 HEALTHCARE MAINTENANCE: Primary | ICD-10-CM

## 2025-01-07 PROCEDURE — 99999 PR PBB SHADOW E&M-EST. PATIENT-LVL I: CPT | Mod: PBBFAC,,,

## 2025-01-07 NOTE — PROGRESS NOTES
Patient received 2 vaccines IM to the right deltoid, Covid Pfizer anterior and Flu posterior.  Tolerated well and left in NAD

## 2025-04-29 ENCOUNTER — CLINICAL SUPPORT (OUTPATIENT)
Dept: INTERNAL MEDICINE | Facility: CLINIC | Age: 65
End: 2025-04-29

## 2025-04-29 DIAGNOSIS — Z00.00 ROUTINE MEDICAL EXAM: ICD-10-CM

## 2025-04-29 DIAGNOSIS — Z91.89 AT HIGH RISK FOR CARDIOVASCULAR DISEASE: ICD-10-CM

## 2025-04-29 LAB
ABSOLUTE EOSINOPHIL (OHS): 0.17 K/UL
ABSOLUTE MONOCYTE (OHS): 0.57 K/UL (ref 0.3–1)
ABSOLUTE NEUTROPHIL COUNT (OHS): 2.78 K/UL (ref 1.8–7.7)
ALBUMIN SERPL BCP-MCNC: 4.6 G/DL (ref 3.5–5.2)
ALP SERPL-CCNC: 60 UNIT/L (ref 40–150)
ALT SERPL W/O P-5'-P-CCNC: 30 UNIT/L (ref 10–44)
ANION GAP (OHS): 9 MMOL/L (ref 8–16)
AST SERPL-CCNC: 29 UNIT/L (ref 11–45)
BASOPHILS # BLD AUTO: 0.03 K/UL
BASOPHILS NFR BLD AUTO: 0.6 %
BILIRUB SERPL-MCNC: 1.1 MG/DL (ref 0.1–1)
BUN SERPL-MCNC: 16 MG/DL (ref 8–23)
CALCIUM SERPL-MCNC: 9.6 MG/DL (ref 8.7–10.5)
CHLORIDE SERPL-SCNC: 105 MMOL/L (ref 95–110)
CHOLEST SERPL-MCNC: 141 MG/DL (ref 120–199)
CHOLEST/HDLC SERPL: 2.6 {RATIO} (ref 2–5)
CO2 SERPL-SCNC: 27 MMOL/L (ref 23–29)
CREAT SERPL-MCNC: 1 MG/DL (ref 0.5–1.4)
EAG (OHS): 82 MG/DL (ref 68–131)
ERYTHROCYTE [DISTWIDTH] IN BLOOD BY AUTOMATED COUNT: 12.6 % (ref 11.5–14.5)
GFR SERPLBLD CREATININE-BSD FMLA CKD-EPI: >60 ML/MIN/1.73/M2
GLUCOSE SERPL-MCNC: 86 MG/DL (ref 70–110)
HBA1C MFR BLD: 4.5 % (ref 4–5.6)
HCT VFR BLD AUTO: 42.4 % (ref 40–54)
HDLC SERPL-MCNC: 55 MG/DL (ref 40–75)
HDLC SERPL: 39 % (ref 20–50)
HGB BLD-MCNC: 14.2 GM/DL (ref 14–18)
IMM GRANULOCYTES # BLD AUTO: 0.02 K/UL (ref 0–0.04)
IMM GRANULOCYTES NFR BLD AUTO: 0.4 % (ref 0–0.5)
LDLC SERPL CALC-MCNC: 70.6 MG/DL (ref 63–159)
LYMPHOCYTES # BLD AUTO: 1.67 K/UL (ref 1–4.8)
MCH RBC QN AUTO: 30.9 PG (ref 27–31)
MCHC RBC AUTO-ENTMCNC: 33.5 G/DL (ref 32–36)
MCV RBC AUTO: 92 FL (ref 82–98)
NONHDLC SERPL-MCNC: 86 MG/DL
NUCLEATED RBC (/100WBC) (OHS): 0 /100 WBC
PLATELET # BLD AUTO: 288 K/UL (ref 150–450)
PMV BLD AUTO: 9.9 FL (ref 9.2–12.9)
POTASSIUM SERPL-SCNC: 4.4 MMOL/L (ref 3.5–5.1)
PROT SERPL-MCNC: 7.8 GM/DL (ref 6–8.4)
PSA SERPL-MCNC: 0.41 NG/ML
RBC # BLD AUTO: 4.6 M/UL (ref 4.6–6.2)
RELATIVE EOSINOPHIL (OHS): 3.2 %
RELATIVE LYMPHOCYTE (OHS): 31.9 % (ref 18–48)
RELATIVE MONOCYTE (OHS): 10.9 % (ref 4–15)
RELATIVE NEUTROPHIL (OHS): 53 % (ref 38–73)
SODIUM SERPL-SCNC: 141 MMOL/L (ref 136–145)
TRIGL SERPL-MCNC: 77 MG/DL (ref 30–150)
WBC # BLD AUTO: 5.24 K/UL (ref 3.9–12.7)

## 2025-04-29 PROCEDURE — 83695 ASSAY OF LIPOPROTEIN(A): CPT

## 2025-04-29 PROCEDURE — 80061 LIPID PANEL: CPT

## 2025-04-29 PROCEDURE — 84153 ASSAY OF PSA TOTAL: CPT

## 2025-04-29 PROCEDURE — 83036 HEMOGLOBIN GLYCOSYLATED A1C: CPT

## 2025-04-29 PROCEDURE — 85025 COMPLETE CBC W/AUTO DIFF WBC: CPT

## 2025-04-29 PROCEDURE — 80053 COMPREHEN METABOLIC PANEL: CPT

## 2025-04-29 PROCEDURE — 99211 OFF/OP EST MAY X REQ PHY/QHP: CPT

## 2025-05-02 LAB — W LP(A): 13 NMOL/L

## 2025-08-25 RX ORDER — CELECOXIB 200 MG/1
200 CAPSULE ORAL DAILY
Qty: 40 CAPSULE | Refills: 1 | Status: SHIPPED | OUTPATIENT
Start: 2025-08-25 | End: 2025-08-26

## 2025-08-26 RX ORDER — CELECOXIB 200 MG/1
200 CAPSULE ORAL DAILY
Qty: 90 CAPSULE | Refills: 1 | Status: SHIPPED | OUTPATIENT
Start: 2025-08-26

## (undated) DEVICE — SUT 0 VICRYL / UR6 (J603)

## (undated) DEVICE — ADHESIVE MASTISOL VIAL 48/BX

## (undated) DEVICE — DRAPE ABDOMINAL TIBURON 14X11

## (undated) DEVICE — TROCAR ENDOPATH XCEL 5X100MM

## (undated) DEVICE — CLIP HEMO-LOK ML

## (undated) DEVICE — SUT MCRYL PLUS 4-0 PS2 27IN

## (undated) DEVICE — GOWN SURGICAL X-LARGE

## (undated) DEVICE — TRAY MINOR GEN SURG

## (undated) DEVICE — SYS SMOKE EVACUATION LAP

## (undated) DEVICE — TROCAR ENDOPATH XCEL 12MM 10CM

## (undated) DEVICE — DRESSING TRANS 4X4 TEGADERM

## (undated) DEVICE — SCISSOR 5MMX35CM DIRECT DRIVE

## (undated) DEVICE — ELECTRODE REM PLYHSV RETURN 9

## (undated) DEVICE — TUBING HF INSUFFLATION W/ FLTR

## (undated) DEVICE — CANNULA ENDOPATH XCEL 5X100MM

## (undated) DEVICE — CLOSURE SKIN STERI STRIP 1/2X4

## (undated) DEVICE — SPONGE DERMACEA 4X4IN 12PLY

## (undated) DEVICE — NDL HYPO REG 25G X 1 1/2

## (undated) DEVICE — SEE MEDLINE ITEM 157117